# Patient Record
Sex: MALE | Race: WHITE | NOT HISPANIC OR LATINO | Employment: FULL TIME | URBAN - METROPOLITAN AREA
[De-identification: names, ages, dates, MRNs, and addresses within clinical notes are randomized per-mention and may not be internally consistent; named-entity substitution may affect disease eponyms.]

---

## 2019-07-27 ENCOUNTER — OFFICE VISIT (OUTPATIENT)
Dept: URGENT CARE | Facility: PHYSICIAN GROUP | Age: 29
End: 2019-07-27
Payer: COMMERCIAL

## 2019-07-27 VITALS
HEART RATE: 76 BPM | OXYGEN SATURATION: 97 % | RESPIRATION RATE: 16 BRPM | TEMPERATURE: 98.6 F | DIASTOLIC BLOOD PRESSURE: 82 MMHG | WEIGHT: 144 LBS | SYSTOLIC BLOOD PRESSURE: 128 MMHG

## 2019-07-27 DIAGNOSIS — R09.81 SINUS CONGESTION: ICD-10-CM

## 2019-07-27 DIAGNOSIS — J34.89 SINUS PRESSURE: ICD-10-CM

## 2019-07-27 PROCEDURE — 99203 OFFICE O/P NEW LOW 30 MIN: CPT | Performed by: FAMILY MEDICINE

## 2019-07-27 RX ORDER — AMOXICILLIN AND CLAVULANATE POTASSIUM 875; 125 MG/1; MG/1
1 TABLET, FILM COATED ORAL 2 TIMES DAILY
Qty: 14 TAB | Refills: 0 | Status: SHIPPED | OUTPATIENT
Start: 2019-07-27 | End: 2019-08-03

## 2019-07-27 NOTE — PROGRESS NOTES
Subjective:      Baron Smith is a 29 y.o. male who presents with Sinus Problem (possible sinus infection moved into chest,congestion,cough, x 5-6 days)            This is a new problem.  29-year-old otherwise healthy presenting with 6-day history of cough and sinus congestion, sinus pressure.  No history of sinus surgery or frequent sinus infection reported.  He denies any fever chills, sore throat or earache at the present time.        Review of Systems   All other systems reviewed and are negative.         Objective:     /82 (BP Location: Right arm, Patient Position: Sitting, BP Cuff Size: Adult)   Pulse 76   Temp 37 °C (98.6 °F) (Temporal)   Resp 16   Wt 65.3 kg (144 lb)   SpO2 97%      Physical Exam   Constitutional: He is oriented to person, place, and time. He appears well-developed and well-nourished.  Non-toxic appearance. No distress.   HENT:   Head: Normocephalic and atraumatic.   Right Ear: Tympanic membrane, external ear and ear canal normal.   Left Ear: Tympanic membrane, external ear and ear canal normal.   Nose: Mucosal edema present. Right sinus exhibits no maxillary sinus tenderness and no frontal sinus tenderness. Left sinus exhibits no maxillary sinus tenderness and no frontal sinus tenderness.   Mouth/Throat: Uvula is midline and oropharynx is clear and moist. No oral lesions. No trismus in the jaw. No uvula swelling. No oropharyngeal exudate, posterior oropharyngeal edema, posterior oropharyngeal erythema or tonsillar abscesses. No tonsillar exudate.   Eyes: Conjunctivae are normal. No scleral icterus.   Neck: Neck supple.   Cardiovascular: Normal rate and regular rhythm.  Exam reveals no gallop and no friction rub.    No murmur heard.  Pulmonary/Chest: Effort normal. No stridor. No respiratory distress. He has no wheezes. He has no rales.   Lymphadenopathy:     He has no cervical adenopathy.   Neurological: He is alert and oriented to person, place, and time.   Skin: Skin is  warm. No rash noted. He is not diaphoretic. No erythema. No pallor.   Psychiatric: He has a normal mood and affect.               Assessment/Plan:     1. Sinus congestion  - amoxicillin-clavulanate (AUGMENTIN) 875-125 MG Tab; Take 1 Tab by mouth 2 times a day for 7 days.  Dispense: 14 Tab; Refill: 0    2. Sinus pressure  - amoxicillin-clavulanate (AUGMENTIN) 875-125 MG Tab; Take 1 Tab by mouth 2 times a day for 7 days.  Dispense: 14 Tab; Refill: 0      Viral versus bacterial.  Discussed watchful waiting for now, prescription for Augmentin given if he is not better in the next few days.  Continue nasal saline irrigation over-the-counter education as needed for supportive care  Warning signs reviewed

## 2019-07-28 ENCOUNTER — TELEPHONE (OUTPATIENT)
Dept: URGENT CARE | Facility: PHYSICIAN GROUP | Age: 29
End: 2019-07-28

## 2019-07-28 DIAGNOSIS — J32.9 SINUSITIS, UNSPECIFIED CHRONICITY, UNSPECIFIED LOCATION: ICD-10-CM

## 2019-07-28 NOTE — TELEPHONE ENCOUNTER
Pt requesting to have RX transferred due to insurance coverage.  Please redirect to [facility] in [place] [address]

## 2019-07-29 RX ORDER — AMOXICILLIN AND CLAVULANATE POTASSIUM 875; 125 MG/1; MG/1
1 TABLET, FILM COATED ORAL 2 TIMES DAILY
Qty: 14 TAB | Refills: 0 | Status: SHIPPED | OUTPATIENT
Start: 2019-07-29 | End: 2019-08-05

## 2019-11-13 ENCOUNTER — OFFICE VISIT (OUTPATIENT)
Dept: URGENT CARE | Facility: CLINIC | Age: 29
End: 2019-11-13
Payer: COMMERCIAL

## 2019-11-13 VITALS
OXYGEN SATURATION: 97 % | TEMPERATURE: 97 F | WEIGHT: 146.6 LBS | HEART RATE: 78 BPM | BODY MASS INDEX: 23.01 KG/M2 | HEIGHT: 67 IN | RESPIRATION RATE: 16 BRPM | SYSTOLIC BLOOD PRESSURE: 120 MMHG | DIASTOLIC BLOOD PRESSURE: 80 MMHG

## 2019-11-13 DIAGNOSIS — J01.00 ACUTE MAXILLARY SINUSITIS, RECURRENCE NOT SPECIFIED: ICD-10-CM

## 2019-11-13 PROCEDURE — 99214 OFFICE O/P EST MOD 30 MIN: CPT | Performed by: PHYSICIAN ASSISTANT

## 2019-11-13 RX ORDER — AMOXICILLIN AND CLAVULANATE POTASSIUM 875; 125 MG/1; MG/1
1 TABLET, FILM COATED ORAL 2 TIMES DAILY
Qty: 14 TAB | Refills: 0 | Status: SHIPPED | OUTPATIENT
Start: 2019-11-13 | End: 2019-11-20

## 2019-11-13 ASSESSMENT — ENCOUNTER SYMPTOMS
CHILLS: 0
NAUSEA: 0
PALPITATIONS: 0
COUGH: 1
VOMITING: 0
MYALGIAS: 0
FATIGUE: 1
NECK PAIN: 0
SHORTNESS OF BREATH: 0
SPUTUM PRODUCTION: 1
SINUS PAIN: 1
SENSORY CHANGE: 0
ANOREXIA: 0
SORE THROAT: 0
FOCAL WEAKNESS: 0
TINGLING: 0
SWOLLEN GLANDS: 0
WHEEZING: 0
HEADACHES: 1
FEVER: 0
ABDOMINAL PAIN: 0

## 2019-11-13 NOTE — PROGRESS NOTES
"Subjective:      Baron Smith is a 29 y.o. male who presents with Sinus Pain (x4days, sinus pain, sinus congestion, ears congested, sore throat, dizziness)            Sinus Pain   This is a new problem. The current episode started in the past 7 days. The problem occurs constantly. The problem has been gradually worsening. Associated symptoms include congestion, coughing (productive with green mucous ), fatigue and headaches. Pertinent negatives include no abdominal pain, anorexia, chest pain, chills, fever, myalgias, nausea, neck pain, rash, sore throat, swollen glands or vomiting. The symptoms are aggravated by bending. Treatments tried: theraflu  The treatment provided mild relief.       No past medical history on file.    No past surgical history on file.    No family history on file.    Allergies   Allergen Reactions   • Shellfish Allergy        Medications, Allergies, and current problem list reviewed today in Epic    Review of Systems   Constitutional: Positive for fatigue and malaise/fatigue. Negative for chills and fever.   HENT: Positive for congestion, ear pain (left ear fullness) and sinus pain. Negative for ear discharge and sore throat.    Respiratory: Positive for cough (productive with green mucous ) and sputum production. Negative for shortness of breath and wheezing.    Cardiovascular: Negative for chest pain, palpitations and leg swelling.   Gastrointestinal: Negative for abdominal pain, anorexia, nausea and vomiting.   Musculoskeletal: Negative for myalgias and neck pain.   Skin: Negative for rash.   Neurological: Positive for headaches. Negative for tingling, sensory change and focal weakness.     All other systems reviewed and are negative.        Objective:     /80 (BP Location: Left arm, Patient Position: Sitting, BP Cuff Size: Adult)   Pulse 78   Temp 36.1 °C (97 °F) (Temporal)   Resp 16   Ht 1.702 m (5' 7\")   Wt 66.5 kg (146 lb 9.6 oz)   SpO2 97%   BMI 22.96 kg/m²  "     Physical Exam  Constitutional:       General: He is not in acute distress.     Appearance: He is not ill-appearing or diaphoretic.   HENT:      Head: Normocephalic and atraumatic.      Right Ear: Tympanic membrane, ear canal and external ear normal.      Left Ear: Ear canal and external ear normal. A middle ear effusion is present.      Nose: Mucosal edema and rhinorrhea present.      Right Sinus: Maxillary sinus tenderness present.      Left Sinus: Maxillary sinus tenderness present.      Mouth/Throat:      Mouth: Mucous membranes are moist.      Pharynx: Oropharynx is clear. Posterior oropharyngeal erythema present.   Neck:      Musculoskeletal: Normal range of motion.   Cardiovascular:      Rate and Rhythm: Normal rate and regular rhythm.      Heart sounds: No murmur. No friction rub. No gallop.    Pulmonary:      Effort: Pulmonary effort is normal. No respiratory distress.      Breath sounds: Normal breath sounds. No wheezing, rhonchi or rales.   Musculoskeletal: Normal range of motion.   Lymphadenopathy:      Cervical: No cervical adenopathy.   Skin:     General: Skin is warm and dry.      Findings: No rash.   Neurological:      General: No focal deficit present.      Mental Status: He is alert and oriented to person, place, and time.   Psychiatric:         Mood and Affect: Mood normal.         Behavior: Behavior normal.         Thought Content: Thought content normal.         Judgment: Judgment normal.                 Assessment/Plan:     1. Acute maxillary sinusitis, recurrence not specified  amoxicillin-clavulanate (AUGMENTIN) 875-125 MG Tab         Current Outpatient Medications:   •  amoxicillin-clavulanate (AUGMENTIN) 875-125 MG Tab, Take 1 Tab by mouth 2 times a day for 7 days., Disp: 14 Tab, Rfl: 0    Push fluids. Viral etiology discussed.  Patient was given a contingent antibiotic prescription to fill and use as directed if symptoms progressed as discussed and agreed upon.    Encouraged Flonase and  saline rinses.     Differential diagnoses, Supportive care, and indications for immediate follow-up discussed with patient.   Instructed to return to clinic or nearest emergency department for any change in condition, further concerns, or worsening of symptoms.    The patient demonstrated a good understanding and agreed with the treatment plan.    Tameka Barron P.A.-C.

## 2019-12-16 ENCOUNTER — OFFICE VISIT (OUTPATIENT)
Dept: URGENT CARE | Facility: CLINIC | Age: 29
End: 2019-12-16
Payer: COMMERCIAL

## 2019-12-16 VITALS
HEART RATE: 85 BPM | RESPIRATION RATE: 16 BRPM | DIASTOLIC BLOOD PRESSURE: 82 MMHG | TEMPERATURE: 99.1 F | WEIGHT: 146 LBS | BODY MASS INDEX: 22.91 KG/M2 | OXYGEN SATURATION: 96 % | SYSTOLIC BLOOD PRESSURE: 110 MMHG | HEIGHT: 67 IN

## 2019-12-16 DIAGNOSIS — R68.89 FLU-LIKE SYMPTOMS: ICD-10-CM

## 2019-12-16 DIAGNOSIS — K12.0 CANKER SORES ORAL: ICD-10-CM

## 2019-12-16 LAB
INT CON NEG: NEGATIVE
INT CON POS: POSITIVE
S PYO AG THROAT QL: NEGATIVE

## 2019-12-16 PROCEDURE — 87880 STREP A ASSAY W/OPTIC: CPT | Performed by: PHYSICIAN ASSISTANT

## 2019-12-16 PROCEDURE — 99214 OFFICE O/P EST MOD 30 MIN: CPT | Performed by: PHYSICIAN ASSISTANT

## 2019-12-16 RX ORDER — ACETAMINOPHEN 500 MG
500-1000 TABLET ORAL EVERY 6 HOURS PRN
COMMUNITY
End: 2021-10-14

## 2019-12-16 RX ORDER — DEXAMETHASONE 0.5 MG/5ML
0.5 ELIXIR ORAL 3 TIMES DAILY
Qty: 75 ML | Refills: 0 | Status: SHIPPED | OUTPATIENT
Start: 2019-12-16 | End: 2019-12-21

## 2019-12-16 ASSESSMENT — ENCOUNTER SYMPTOMS
HEADACHES: 1
COUGH: 1
SORE THROAT: 1
NAUSEA: 0
VOMITING: 0
FEVER: 1

## 2019-12-16 NOTE — PROGRESS NOTES
Subjective:   Baron Smith is a 29 y.o. male who presents for Fever (x4days, congestion, this morning sorethroat)        Patient complains of sore throat, congestion, fever x4 days.  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    Fever    This is a new problem. The current episode started in the past 7 days. The problem occurs constantly. The problem has been gradually improving. The maximum temperature noted was 100 to 100.9 F. The temperature was taken using an oral thermometer. Associated symptoms include congestion (mild), coughing (mild dry), headaches, muscle aches and a sore throat. Pertinent negatives include no ear pain, nausea or vomiting. He has tried acetaminophen and NSAIDs for the symptoms. The treatment provided moderate relief.   Risk factors: sick contacts      Review of Systems   Constitutional: Positive for fever.   HENT: Positive for congestion (mild) and sore throat. Negative for ear pain.    Respiratory: Positive for cough (mild dry).    Gastrointestinal: Negative for nausea and vomiting.   Neurological: Positive for headaches.       PMH:  has no past medical history on file.  MEDS:   Current Outpatient Medications:   •  acetaminophen (TYLENOL) 500 MG Tab, Take 500-1,000 mg by mouth every 6 hours as needed., Disp: , Rfl:   •  dexamethasone (DECADRON) 0.5 MG/5ML Elixir, Take 5 mL by mouth 3 times a day for 5 days. Swish/ gurgle and spit. Do not rinse, eat or drink for 30 min following treatment., Disp: 75 mL, Rfl: 0  ALLERGIES:   Allergies   Allergen Reactions   • Shellfish Allergy      SURGHX: History reviewed. No pertinent surgical history.  SOCHX:  reports that he has never smoked. He has never used smokeless tobacco. He reports current alcohol use. He reports current drug use. Drug: Marijuana.  FH: Family history was reviewed, no pertinent findings to report   Objective:   /82 (BP Location: Left arm, Patient Position: Sitting, BP Cuff Size: Adult)   Pulse 85   Temp 37.3 °C (99.1 °F)  "(Temporal)   Resp 16   Ht 1.702 m (5' 7\")   Wt 66.2 kg (146 lb)   SpO2 96%   BMI 22.87 kg/m²   Physical Exam  Vitals signs reviewed.   Constitutional:       General: He is not in acute distress.     Appearance: Normal appearance. He is well-developed. He is not toxic-appearing.   HENT:      Head: Normocephalic and atraumatic.      Right Ear: Tympanic membrane, ear canal and external ear normal.      Left Ear: Tympanic membrane, ear canal and external ear normal.      Nose: Mucosal edema present. No congestion or rhinorrhea.      Mouth/Throat:      Lips: Pink.      Mouth: Mucous membranes are moist. Oral lesions (Several aphthous ulcers on anterior peritonsillar pillars and one on right bugle mucosa.  No discharge.  No vesicles.) present.      Pharynx: Oropharynx is clear. Uvula midline.      Tonsils: No tonsillar exudate. Swellin+ on the right. 1+ on the left.   Eyes:      General: Lids are normal.      Conjunctiva/sclera: Conjunctivae normal.   Neck:      Musculoskeletal: Neck supple.   Cardiovascular:      Rate and Rhythm: Normal rate and regular rhythm.      Heart sounds: Normal heart sounds, S1 normal and S2 normal. No murmur. No friction rub. No gallop.    Pulmonary:      Effort: Pulmonary effort is normal. No respiratory distress.      Breath sounds: Normal breath sounds. No decreased breath sounds, wheezing, rhonchi or rales.   Musculoskeletal:      Comments: Normal range of motion. Exhibits no edema and no tenderness.    Lymphadenopathy:      Cervical:      Right cervical: No superficial or posterior cervical adenopathy.     Left cervical: No superficial or posterior cervical adenopathy.   Skin:     General: Skin is warm and dry.      Capillary Refill: Capillary refill takes less than 2 seconds.   Neurological:      Mental Status: He is alert and oriented to person, place, and time.      Cranial Nerves: No cranial nerve deficit.      Sensory: No sensory deficit.   Psychiatric:         Speech: Speech " normal.         Behavior: Behavior normal.         Thought Content: Thought content normal.         Judgment: Judgment normal.           Assessment/Plan:   1. Flu-like symptoms    2. Canker sores oral  - POCT Rapid Strep A  - dexamethasone (DECADRON) 0.5 MG/5ML Elixir; Take 5 mL by mouth 3 times a day for 5 days. Swish/ gurgle and spit. Do not rinse, eat or drink for 30 min following treatment.  Dispense: 75 mL; Refill: 0    Other orders  - acetaminophen (TYLENOL) 500 MG Tab; Take 500-1,000 mg by mouth every 6 hours as needed.    I suspect symptoms may be secondary to flu.  Rapid strep negative.  Patient's worst symptom is his oral pain that appears to be secondary to aphthous ulcers.  I will treat with topical steroids.  Patient states that he gets these frequently.  I would like him to follow-up with his PCP to have this further evaluated.    VSS, no dyspnea, no SOB, and lungs CTA on PE.  Goals of care include symptomatic control and prevention of lower respiratory spread. Signs of lower respiratory involvement discussed with pt.  Pt instructed to RTC if any of these are observed.     Drink plenty of fluids and rest.  Use nasal saline TID to promote drainage.   Salt water gurgles to soothe sore throat.  Start OTC expectorant.  APAP for fever control, and NSAIDs for throat pain/headache relief prn.    If you fail to improve in 3-5 days or symptoms worsen/new symptoms develop, RTC for reevaluation.    If symptoms worsen or new symptoms develop to include uncontrolled fevers, dyspnea, SOB, or vomiting- pt was instructed to go to the ED for evaluation.    Differential diagnosis, natural history, supportive care, and indications for immediate follow-up discussed.

## 2019-12-19 ENCOUNTER — HOSPITAL ENCOUNTER (EMERGENCY)
Dept: HOSPITAL 8 - ED | Age: 29
Discharge: HOME | End: 2019-12-19
Payer: COMMERCIAL

## 2019-12-19 VITALS — HEIGHT: 67 IN | WEIGHT: 141.32 LBS | BODY MASS INDEX: 22.18 KG/M2

## 2019-12-19 VITALS — SYSTOLIC BLOOD PRESSURE: 144 MMHG | DIASTOLIC BLOOD PRESSURE: 86 MMHG

## 2019-12-19 DIAGNOSIS — F17.200: ICD-10-CM

## 2019-12-19 DIAGNOSIS — B34.9: Primary | ICD-10-CM

## 2019-12-19 PROCEDURE — 94640 AIRWAY INHALATION TREATMENT: CPT

## 2019-12-19 PROCEDURE — 93005 ELECTROCARDIOGRAM TRACING: CPT

## 2019-12-19 PROCEDURE — 99283 EMERGENCY DEPT VISIT LOW MDM: CPT

## 2019-12-19 PROCEDURE — 71046 X-RAY EXAM CHEST 2 VIEWS: CPT

## 2019-12-19 NOTE — NUR
PT MED AS NOTED.  LEFT THIGH BURN SILVADENE CREAM APPLIED, ADAPTIC DRESSING, 
KERLEX AND THEN COBAN WRAP.  PT TOLLERATED WELL. [facility] MD AT BEDSIDE, POC 
DISCUSSED AND QUESTIONS ANSWERED.

## 2019-12-19 NOTE — NUR
DR WALLIS AT BEDSIDE. PT VSS, LUNG CLEAR.  POC DISCUSSED AND ORDERS REC'D.  
CALL LIGHT W/I REACH, WARM BLANKET AND PILLOW PROVIDED

## 2020-01-25 ENCOUNTER — TELEPHONE (OUTPATIENT)
Dept: SCHEDULING | Facility: IMAGING CENTER | Age: 30
End: 2020-01-25

## 2020-01-31 ENCOUNTER — OFFICE VISIT (OUTPATIENT)
Dept: MEDICAL GROUP | Facility: MEDICAL CENTER | Age: 30
End: 2020-01-31
Payer: COMMERCIAL

## 2020-01-31 VITALS
DIASTOLIC BLOOD PRESSURE: 82 MMHG | TEMPERATURE: 98.9 F | HEIGHT: 67 IN | HEART RATE: 71 BPM | OXYGEN SATURATION: 97 % | SYSTOLIC BLOOD PRESSURE: 126 MMHG | WEIGHT: 144.18 LBS | BODY MASS INDEX: 22.63 KG/M2

## 2020-01-31 DIAGNOSIS — Z91.013 ALLERGY TO SHELLFISH: ICD-10-CM

## 2020-01-31 DIAGNOSIS — Z00.00 WELL ADULT EXAM: ICD-10-CM

## 2020-01-31 DIAGNOSIS — Z23 NEED FOR VACCINATION: ICD-10-CM

## 2020-01-31 DIAGNOSIS — F41.9 ANXIETY: ICD-10-CM

## 2020-01-31 PROCEDURE — 99214 OFFICE O/P EST MOD 30 MIN: CPT | Mod: 25 | Performed by: FAMILY MEDICINE

## 2020-01-31 PROCEDURE — 90471 IMMUNIZATION ADMIN: CPT | Performed by: FAMILY MEDICINE

## 2020-01-31 PROCEDURE — 90715 TDAP VACCINE 7 YRS/> IM: CPT | Performed by: FAMILY MEDICINE

## 2020-01-31 RX ORDER — EPINEPHRINE 0.3 MG/.3ML
0.3 INJECTION SUBCUTANEOUS ONCE
Qty: 0.3 ML | Refills: 0 | Status: SHIPPED | OUTPATIENT
Start: 2020-01-31 | End: 2020-01-31

## 2020-01-31 RX ORDER — SERTRALINE HYDROCHLORIDE 25 MG/1
25 TABLET, FILM COATED ORAL DAILY
Qty: 30 TAB | Refills: 11 | Status: SHIPPED | OUTPATIENT
Start: 2020-01-31 | End: 2021-03-08

## 2020-01-31 RX ORDER — EPINEPHRINE 0.3 MG/.3ML
0.3 INJECTION SUBCUTANEOUS ONCE
COMMUNITY
End: 2020-01-31 | Stop reason: SDUPTHER

## 2020-01-31 ASSESSMENT — PATIENT HEALTH QUESTIONNAIRE - PHQ9
CLINICAL INTERPRETATION OF PHQ2 SCORE: 3
5. POOR APPETITE OR OVEREATING: 3 - NEARLY EVERY DAY
SUM OF ALL RESPONSES TO PHQ QUESTIONS 1-9: 13

## 2020-01-31 NOTE — PROGRESS NOTES
Subjective:     CC: Diagnoses of Need for vaccination, Anxiety, Allergy to shellfish, and Well adult exam were pertinent to this visit.    HPI: Patient is a 29 y.o. male new patient who presents today to [facility] care.  He is generally quite healthy.  His only concern today is recent anxiety.      Anxiety  New problem.  Had a severe panic attack around [place] time which she went to the ER at [place].  He had a chest x-ray that was clear as well as breathing test which were normal. Has had significant anxiety daily since his panic attack.  He has had small bouts of anxiety in the past but never this severe.   He just started therapy 2weeks ago.   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   Working out now with no improvement.   Improved eating habits with no improvement.   Patient does have a history of depression.  He was treated with an SSRI for 1 year back in high school.    Allergy to shellfish  Chronic problem.  The patient requests a refill on his EpiPen.  He is allergic to shellfish.    Past Medical History:   Diagnosis Date   • Allergy    • Anxiety        Social History     Tobacco Use   • Smoking status: Never Smoker   • Smokeless tobacco: Never Used   Substance Use Topics   • Alcohol use: Yes     Comment: social   • Drug use: Yes     Types: Marijuana       Current Outpatient Medications Ordered in Epic   Medication Sig Dispense Refill   • EPINEPHrine (EPIPEN 2-GUMARO) 0.3 MG/0.3ML Solution Auto-injector solution for injection 0.3 mL by Intramuscular route Once for 1 dose. 0.3 mL 0   • sertraline (ZOLOFT) 25 MG tablet Take 1 Tab by mouth every day. 30 Tab 11   • acetaminophen (TYLENOL) 500 MG Tab Take 500-1,000 mg by mouth every 6 hours as needed.       No current Epic-ordered facility-administered medications on file.        Allergies:  Shellfish allergy    Health Maintenance: Completed    ROS:  Gen: no fevers/chill, no changes in weight  Eyes: no  "changes in vision  ENT: no sore throat, no hearing loss, no bloody nose  Pulm: no sob, no cough  CV: no chest pain, no palpitations  GI: no nausea/vomiting, no diarrhea  : no dysuria  MSk: no myalgias  Skin: no rash  Neuro: no headaches, no numbness/tingling  Heme/Lymph: no easy bruising      Objective:       Exam:  /82 (BP Location: Left arm, Patient Position: Sitting, BP Cuff Size: Adult)   Pulse 71   Temp 37.2 °C (98.9 °F) (Temporal)   Ht 1.702 m (5' 7\")   Wt 65.4 kg (144 lb 2.9 oz)   SpO2 97%   BMI 22.58 kg/m²  Body mass index is 22.58 kg/m².      General: Normal appearing. No distress.  HEAD: NCAT  EYES: conjunctiva clear, lids without ptosis, pupils equal  and reactive to light  EARS: ears normal shape and contour, canals are clear bilaterally, TMs clear  MOUTH: oropharynx is without erythema, edema or exudates.   Neck: Supple without masses. Thyroid is not enlarged. Normal ROM  Pulmonary: Clear to ausculation.  Normal effort. No rales, ronchi, or wheezing.  Cardiovascular: Regular rate and rhythm, no murmur. No LE edema  Neurologic: Grossly normal, no focal deficits  Lymph: No cervical or supraclavicular lymph nodes are palpable  Skin: Warm and dry.  No obvious lesions.  Musculoskeletal: Normal gait and station.   Psych: Normal mood and affect. Alert and oriented x3. Judgment and insight is normal.      Assessment & Plan:     29 y.o. male with the following -     1. Anxiety  New problem.  The patient reports severe generalized anxiety daily for the past 6 weeks.  The patient is interested in possibly starting a daily medication.  Prescription given for sertraline 25 mg.  He is already working with a therapist.  The patient is a therapist himself as well.  Plan to follow-up in 4 weeks.  - sertraline (ZOLOFT) 25 MG tablet; Take 1 Tab by mouth every day.  Dispense: 30 Tab; Refill: 11    2. Allergy to shellfish  Chronic problem.  The patient requests a refill on his EpiPen.  He is allergic to " shellfish.  - EPINEPHrine (EPIPEN 2-GUMARO) 0.3 MG/0.3ML Solution Auto-injector solution for injection; 0.3 mL by Intramuscular route Once for 1 dose.  Dispense: 0.3 mL; Refill: 0    3. Well adult exam  - TSH WITH REFLEX TO FT4; Future  - CBC WITH DIFFERENTIAL; Future  - Comp Metabolic Panel; Future    4. Need for vaccination  - Tdap Vaccine =>8YO IM      Return in about 4 weeks (around 2/28/2020).    Please note that this dictation was created using voice recognition software. I have made every reasonable attempt to correct obvious errors, but I expect that there are errors of grammar and possibly content that I did not discover before finalizing the note.

## 2020-01-31 NOTE — ASSESSMENT & PLAN NOTE
Had a panic attack around [place] time. Has had significant anxiety daily since his panic attack. He did go the ER at [place] [facility] for this panic attack. Had CXR which was clear and a breathing test which was normal.   He has had small bouts of anxiety in the past but never this severe.   He just started therapy 2-3 weeks.   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   Working out now.   Improved eating.

## 2020-02-03 ENCOUNTER — HOSPITAL ENCOUNTER (OUTPATIENT)
Dept: LAB | Facility: MEDICAL CENTER | Age: 30
End: 2020-02-03
Attending: FAMILY MEDICINE
Payer: COMMERCIAL

## 2020-02-03 DIAGNOSIS — Z00.00 WELL ADULT EXAM: ICD-10-CM

## 2020-02-03 LAB
ALBUMIN SERPL BCP-MCNC: 5.1 G/DL (ref 3.2–4.9)
ALBUMIN/GLOB SERPL: 2 G/DL
ALP SERPL-CCNC: 50 U/L (ref 30–99)
ALT SERPL-CCNC: 10 U/L (ref 2–50)
ANION GAP SERPL CALC-SCNC: 8 MMOL/L (ref 0–11.9)
AST SERPL-CCNC: 15 U/L (ref 12–45)
BASOPHILS # BLD AUTO: 1 % (ref 0–1.8)
BASOPHILS # BLD: 0.08 K/UL (ref 0–0.12)
BILIRUB SERPL-MCNC: 1.1 MG/DL (ref 0.1–1.5)
BUN SERPL-MCNC: 13 MG/DL (ref 8–22)
CALCIUM SERPL-MCNC: 10.4 MG/DL (ref 8.5–10.5)
CHLORIDE SERPL-SCNC: 103 MMOL/L (ref 96–112)
CO2 SERPL-SCNC: 26 MMOL/L (ref 20–33)
CREAT SERPL-MCNC: 1.01 MG/DL (ref 0.5–1.4)
EOSINOPHIL # BLD AUTO: 0.17 K/UL (ref 0–0.51)
EOSINOPHIL NFR BLD: 2.2 % (ref 0–6.9)
ERYTHROCYTE [DISTWIDTH] IN BLOOD BY AUTOMATED COUNT: 42.5 FL (ref 35.9–50)
GLOBULIN SER CALC-MCNC: 2.6 G/DL (ref 1.9–3.5)
GLUCOSE SERPL-MCNC: 86 MG/DL (ref 65–99)
HCT VFR BLD AUTO: 45.3 % (ref 42–52)
HGB BLD-MCNC: 15.6 G/DL (ref 14–18)
IMM GRANULOCYTES # BLD AUTO: 0.02 K/UL (ref 0–0.11)
IMM GRANULOCYTES NFR BLD AUTO: 0.3 % (ref 0–0.9)
LYMPHOCYTES # BLD AUTO: 2.36 K/UL (ref 1–4.8)
LYMPHOCYTES NFR BLD: 30 % (ref 22–41)
MCH RBC QN AUTO: 31.8 PG (ref 27–33)
MCHC RBC AUTO-ENTMCNC: 34.4 G/DL (ref 33.7–35.3)
MCV RBC AUTO: 92.4 FL (ref 81.4–97.8)
MONOCYTES # BLD AUTO: 0.56 K/UL (ref 0–0.85)
MONOCYTES NFR BLD AUTO: 7.1 % (ref 0–13.4)
NEUTROPHILS # BLD AUTO: 4.68 K/UL (ref 1.82–7.42)
NEUTROPHILS NFR BLD: 59.4 % (ref 44–72)
NRBC # BLD AUTO: 0 K/UL
NRBC BLD-RTO: 0 /100 WBC
PLATELET # BLD AUTO: 320 K/UL (ref 164–446)
PMV BLD AUTO: 9.6 FL (ref 9–12.9)
POTASSIUM SERPL-SCNC: 4.1 MMOL/L (ref 3.6–5.5)
PROT SERPL-MCNC: 7.7 G/DL (ref 6–8.2)
RBC # BLD AUTO: 4.9 M/UL (ref 4.7–6.1)
SODIUM SERPL-SCNC: 137 MMOL/L (ref 135–145)
TSH SERPL DL<=0.005 MIU/L-ACNC: 0.93 UIU/ML (ref 0.38–5.33)
WBC # BLD AUTO: 7.9 K/UL (ref 4.8–10.8)

## 2020-02-03 PROCEDURE — 85025 COMPLETE CBC W/AUTO DIFF WBC: CPT

## 2020-02-03 PROCEDURE — 80053 COMPREHEN METABOLIC PANEL: CPT

## 2020-02-03 PROCEDURE — 36415 COLL VENOUS BLD VENIPUNCTURE: CPT

## 2020-02-03 PROCEDURE — 84443 ASSAY THYROID STIM HORMONE: CPT

## 2020-03-02 ENCOUNTER — OFFICE VISIT (OUTPATIENT)
Dept: MEDICAL GROUP | Facility: MEDICAL CENTER | Age: 30
End: 2020-03-02
Payer: COMMERCIAL

## 2020-03-02 VITALS
HEIGHT: 67 IN | HEART RATE: 71 BPM | BODY MASS INDEX: 22.35 KG/M2 | OXYGEN SATURATION: 94 % | TEMPERATURE: 97.9 F | SYSTOLIC BLOOD PRESSURE: 100 MMHG | WEIGHT: 142.4 LBS | DIASTOLIC BLOOD PRESSURE: 68 MMHG

## 2020-03-02 DIAGNOSIS — F41.9 ANXIETY: ICD-10-CM

## 2020-03-02 PROCEDURE — 99213 OFFICE O/P EST LOW 20 MIN: CPT | Performed by: FAMILY MEDICINE

## 2020-03-02 ASSESSMENT — FIBROSIS 4 INDEX: FIB4 SCORE: 0.42

## 2020-03-02 NOTE — PROGRESS NOTES
"Subjective:     CC: The encounter diagnosis was Anxiety.    HPI: Patient is a 30 y.o. male established patient who presents today to follow-up on anxiety and discuss labs.      Anxiety  Chronic problem.  The patient note significant improvement with sertraline.  We started on 12.5 mg daily, he is now on 25 mg for the past 10 days.  He did have significant nausea, that has resolved after the first 5 days.  He has found a therapist, started therapy.   No panic attacks since starting the medication.  Has been exercising, training for 1/2 marathon.       Past Medical History:   Diagnosis Date   • Allergy    • Anxiety        Social History     Tobacco Use   • Smoking status: Never Smoker   • Smokeless tobacco: Never Used   Substance Use Topics   • Alcohol use: Yes     Comment: social   • Drug use: Yes     Types: Marijuana       Current Outpatient Medications Ordered in Epic   Medication Sig Dispense Refill   • sertraline (ZOLOFT) 25 MG tablet Take 1 Tab by mouth every day. 30 Tab 11   • acetaminophen (TYLENOL) 500 MG Tab Take 500-1,000 mg by mouth every 6 hours as needed.       No current Epic-ordered facility-administered medications on file.        Allergies:  Shellfish allergy    Health Maintenance: Completed    ROS:  Pulm: no sob, no cough  CV: no chest pain, no palpitations        Objective:       Exam:  /68 (BP Location: Left arm, Patient Position: Sitting, BP Cuff Size: Adult long)   Pulse 71   Temp 36.6 °C (97.9 °F) (Temporal)   Ht 1.702 m (5' 7\")   Wt 64.6 kg (142 lb 6.4 oz)   SpO2 94%   BMI 22.30 kg/m²  Body mass index is 22.3 kg/m².    General: Normal appearing. No distress.  HEAD: NCAT  EYES: conjunctiva clear, lids without ptosis, pupils equal and reactive to light  EARS: ears normal shape and contour.  MOUTH: normal dentition   Neck:  Normal ROM  Pulmonary: Normal effort. Normal respiratory rate.  Cardiovascular: Well perfused. No LE edema  Neurologic: Grossly normal, no focal deficits  Skin: " Warm and dry.  No obvious lesions.  Musculoskeletal: Normal gait and station.   Psych: Normal mood and affect. Alert and oriented x3. Judgment and insight is normal.    Labs: 2/3/2020 results reviewed and discussed with the patient, questions answered.    Assessment & Plan:     30 y.o. male with the following -     1. Anxiety  Chronic problem, improving with sertraline 25 mg.  The patient is currently in therapy.  No panic attacks since last visit.  Plan to follow-up in 3 months.  May consider weaning back off at that point.      Return in about 3 months (around 6/2/2020).    Please note that this dictation was created using voice recognition software. I have made every reasonable attempt to correct obvious errors, but I expect that there are errors of grammar and possibly content that I did not discover before finalizing the note.

## 2020-03-02 NOTE — ASSESSMENT & PLAN NOTE
Chronic problem. Improving. Side effects are improving.   Found a therapist, started therapy.   No panic attacks  Has been exercising, training for 1/2 marathon.

## 2020-03-17 ENCOUNTER — OFFICE VISIT (OUTPATIENT)
Dept: MEDICAL GROUP | Facility: MEDICAL CENTER | Age: 30
End: 2020-03-17
Payer: COMMERCIAL

## 2020-03-17 VITALS
SYSTOLIC BLOOD PRESSURE: 130 MMHG | WEIGHT: 140.8 LBS | DIASTOLIC BLOOD PRESSURE: 88 MMHG | HEIGHT: 67 IN | HEART RATE: 68 BPM | OXYGEN SATURATION: 96 % | BODY MASS INDEX: 22.1 KG/M2 | TEMPERATURE: 97.9 F

## 2020-03-17 DIAGNOSIS — R07.89 CHEST TIGHTNESS: ICD-10-CM

## 2020-03-17 PROCEDURE — 99213 OFFICE O/P EST LOW 20 MIN: CPT | Performed by: FAMILY MEDICINE

## 2020-03-17 ASSESSMENT — FIBROSIS 4 INDEX: FIB4 SCORE: 0.42

## 2020-03-17 NOTE — PROGRESS NOTES
"Subjective:     CC: The encounter diagnosis was Chest tightness.    HPI: Patient is a 30 y.o. male established patient who presents today with concern regarding chest tightness.      Chest tightness  New problem.  Constant  Feels sharp and tight x 5 days in the anterior chest.  Exercises regularly, not issues with exercise.   Has not exercised since this started.   No cough  No fevers or chills  + lightheadedness with standing.  + ear ache  Mild congestion in the sinuses.  Denies any SOB.   Worse with deep breaths.  The patient does report improvement starting today.  Patient does have a history of anxiety, currently on sertraline.  States he does not feel this chest tightness/pain has to do with his anxiety.      Past Medical History:   Diagnosis Date   • Allergy    • Anxiety        Social History     Tobacco Use   • Smoking status: Never Smoker   • Smokeless tobacco: Never Used   Substance Use Topics   • Alcohol use: Yes     Comment: social   • Drug use: Yes     Types: Marijuana       Current Outpatient Medications Ordered in Epic   Medication Sig Dispense Refill   • sertraline (ZOLOFT) 25 MG tablet Take 1 Tab by mouth every day. 30 Tab 11   • acetaminophen (TYLENOL) 500 MG Tab Take 500-1,000 mg by mouth every 6 hours as needed.       No current Epic-ordered facility-administered medications on file.        Allergies:  Shellfish allergy    Health Maintenance: Completed    ROS:  Gen: no fevers/chill, no changes in weight  GI: no nausea/vomiting, no diarrhea        Objective:       Exam:  /88 (BP Location: Left arm, Patient Position: Sitting, BP Cuff Size: Adult long)   Pulse 68   Temp 36.6 °C (97.9 °F) (Temporal)   Ht 1.702 m (5' 7\")   Wt 63.9 kg (140 lb 12.8 oz)   SpO2 96%   BMI 22.05 kg/m²  Body mass index is 22.05 kg/m².      General: Normal appearing. No distress.  HEAD: NCAT  EYES: conjunctiva clear, lids without ptosis, pupils equal  and reactive to light  EARS: ears normal shape and contour, " canals are clear bilaterally, TMs clear  MOUTH: oropharynx is without erythema, edema or exudates.   Neck: Supple without masses. Thyroid is not enlarged. Normal ROM  Pulmonary: Clear to ausculation.  Normal effort. No rales, ronchi, or wheezing.  Cardiovascular: Regular rate and rhythm, no murmur. No LE edema  Neurologic: Grossly normal, no focal deficits  Lymph: No cervical or supraclavicular lymph nodes are palpable  Skin: Warm and dry.  No obvious lesions.  Musculoskeletal: Normal gait and station.   Psych: Normal mood and affect. Alert and oriented x3. Judgment and insight is normal.       Assessment & Plan:     30 y.o. male with the following -     1. Chest tightness  New problem.  The patient does have some associated sinus congestion and ear ache.  Possibly due to viral infection?  Exam was completely normal including lung exam, ear nose and throat and heart.  Given that this is starting to improve advised to continue to monitor for now.  Plan to follow-up if no improvement.      Return if symptoms worsen or fail to improve.    Please note that this dictation was created using voice recognition software. I have made every reasonable attempt to correct obvious errors, but I expect that there are errors of grammar and possibly content that I did not discover before finalizing the note.

## 2020-03-17 NOTE — ASSESSMENT & PLAN NOTE
Constant  Feels sharp and tight x 5 days  Exercises regularly, not issues with exercise.   Has not exercised since this started.   No cough  No fevers or chills  + lightheadedness with standing.  + ear ache  Mild congestion in the sinuses.  Denies any SOB.   Worse with deep breaths

## 2020-06-08 ENCOUNTER — OFFICE VISIT (OUTPATIENT)
Dept: MEDICAL GROUP | Facility: MEDICAL CENTER | Age: 30
End: 2020-06-08
Payer: COMMERCIAL

## 2020-06-08 VITALS
BODY MASS INDEX: 24.01 KG/M2 | WEIGHT: 153 LBS | HEIGHT: 67 IN | OXYGEN SATURATION: 100 % | TEMPERATURE: 98.3 F | DIASTOLIC BLOOD PRESSURE: 80 MMHG | SYSTOLIC BLOOD PRESSURE: 118 MMHG | HEART RATE: 65 BPM

## 2020-06-08 DIAGNOSIS — F41.9 ANXIETY: ICD-10-CM

## 2020-06-08 DIAGNOSIS — T78.40XA ALLERGIC STATE, INITIAL ENCOUNTER: ICD-10-CM

## 2020-06-08 DIAGNOSIS — R07.89 CHEST TIGHTNESS: ICD-10-CM

## 2020-06-08 PROCEDURE — 99214 OFFICE O/P EST MOD 30 MIN: CPT | Performed by: FAMILY MEDICINE

## 2020-06-08 RX ORDER — FLUTICASONE PROPIONATE 50 MCG
1 SPRAY, SUSPENSION (ML) NASAL 2 TIMES DAILY
COMMUNITY
End: 2021-10-14

## 2020-06-08 ASSESSMENT — FIBROSIS 4 INDEX: FIB4 SCORE: 0.42

## 2020-06-08 NOTE — PROGRESS NOTES
"Subjective:     CC: Diagnoses of Chest tightness, Anxiety, and Allergic state, initial encounter were pertinent to this visit.    HPI: Patient is a 30 y.o. male established patient who presents today to f/u on anxiety.      Chest tightness  Chronic problem. Now resolved. Assoc with some other viral symptoms.     Anxiety  Chronic problem. Mood is doing well. Currently on zoloft 25mg. Continues therapy. Would like to stay on medication for the next few months.     Allergies  New problem. Reports sinus congestion. Denies any fevers, chills, body aches. Currently using flonase BID.   The pressure started about 1.5 months ago.   Reports dry scratchy throat as well.         Past Medical History:   Diagnosis Date   • Allergy    • Anxiety        Social History     Tobacco Use   • Smoking status: Never Smoker   • Smokeless tobacco: Never Used   Substance Use Topics   • Alcohol use: Yes     Comment: social   • Drug use: Yes     Types: Marijuana       Current Outpatient Medications Ordered in Epic   Medication Sig Dispense Refill   • fluticasone (FLONASE) 50 MCG/ACT nasal spray Spray 1 Spray in nose 2 times a day.     • sertraline (ZOLOFT) 25 MG tablet Take 1 Tab by mouth every day. 30 Tab 11   • acetaminophen (TYLENOL) 500 MG Tab Take 500-1,000 mg by mouth every 6 hours as needed.       No current Epic-ordered facility-administered medications on file.        Allergies:  Shellfish allergy    Health Maintenance: Completed    ROS:  Pulm: no sob, no cough  CV: no chest pain, no palpitations      Objective:       Exam:  /80 (BP Location: Left arm, Patient Position: Sitting, BP Cuff Size: Adult long)   Pulse 65   Temp 36.8 °C (98.3 °F) (Temporal)   Ht 1.702 m (5' 7\")   Wt 69.4 kg (153 lb)   SpO2 100%   BMI 23.96 kg/m²  Body mass index is 23.96 kg/m².    General: Normal appearing. No distress.  HEAD: NCAT  EYES: conjunctiva clear, lids without ptosis, pupils equal and reactive to light  EARS: ears normal shape and " contour.  MOUTH: normal dentition   Neck:  Normal ROM  Pulmonary: Normal effort. Normal respiratory rate.  Cardiovascular: Well perfused. No LE edema  Neurologic: Grossly normal, no focal deficits  Skin: Warm and dry.  No obvious lesions.  Musculoskeletal: Normal gait and station.   Psych: Normal mood and affect. Alert and oriented x3. Judgment and insight is normal.    Labs: 2/3/20 Results reviewed and discussed with the patient, questions answered.    Assessment & Plan:     30 y.o. male with the following -     1. Chest tightness  Chronic problem, now resolved.  Possibly due to viral infection?  Etiology unclear.    2. Anxiety  Chronic problem, well-controlled on Zoloft 25 mg and participating in therapy.  Plan to follow-up in 3 months.    3. Allergic state, initial encounter  New problem.  Advised to start nasal rinses.  Also instructed him on the proper use of Flonase.  Advised to continue that twice daily.  If no improvement plan to follow-up.      Return in about 3 months (around 9/8/2020).    Please note that this dictation was created using voice recognition software. I have made every reasonable attempt to correct obvious errors, but I expect that there are errors of grammar and possibly content that I did not discover before finalizing the note.

## 2020-09-08 ENCOUNTER — OFFICE VISIT (OUTPATIENT)
Dept: MEDICAL GROUP | Facility: MEDICAL CENTER | Age: 30
End: 2020-09-08
Payer: COMMERCIAL

## 2020-09-08 VITALS
SYSTOLIC BLOOD PRESSURE: 104 MMHG | HEIGHT: 67 IN | HEART RATE: 66 BPM | WEIGHT: 153 LBS | DIASTOLIC BLOOD PRESSURE: 68 MMHG | BODY MASS INDEX: 24.01 KG/M2 | OXYGEN SATURATION: 96 % | TEMPERATURE: 97.5 F

## 2020-09-08 DIAGNOSIS — R53.83 FATIGUE, UNSPECIFIED TYPE: ICD-10-CM

## 2020-09-08 DIAGNOSIS — R06.83 SNORING: ICD-10-CM

## 2020-09-08 DIAGNOSIS — F41.9 ANXIETY: ICD-10-CM

## 2020-09-08 DIAGNOSIS — G47.34 NOCTURNAL HYPOXIA: ICD-10-CM

## 2020-09-08 PROCEDURE — 99214 OFFICE O/P EST MOD 30 MIN: CPT | Performed by: FAMILY MEDICINE

## 2020-09-08 ASSESSMENT — FIBROSIS 4 INDEX: FIB4 SCORE: 0.42

## 2020-09-08 NOTE — ASSESSMENT & PLAN NOTE
Was advised by his dentist that he might have sleep apnea. He was given a home test and was told he had hypoxia.  He snores, has frequent awakenings and feels tired for much for much of the day.

## 2020-09-08 NOTE — PROGRESS NOTES
"Subjective:     CC: Diagnoses of Nocturnal hypoxia, Snoring, Fatigue, unspecified type, and Anxiety were pertinent to this visit.    HPI: Patient is a 30 y.o. male established patient who presents today to follow-up on anxiety also with concern regarding sleep apnea.      Nocturnal hypoxia  New problem.  Was advised by his dentist that he might have sleep apnea due to the parents of his teeth. He was given a home test and was told he had hypoxia.  He snores, has frequent awakenings and feels tired for much for much of the day.  He reports that he never feels refreshed upon awakening.    Anxiety  Chronic problem. Generally doing quite well. Still going to therapy regularly.  Currently on sertraline 25 mg.  Would like to continue on current dose.      Past Medical History:   Diagnosis Date   • Allergy    • Anxiety        Social History     Tobacco Use   • Smoking status: Never Smoker   • Smokeless tobacco: Never Used   Substance Use Topics   • Alcohol use: Yes     Comment: social   • Drug use: Yes     Types: Marijuana       Current Outpatient Medications Ordered in Epic   Medication Sig Dispense Refill   • fluticasone (FLONASE) 50 MCG/ACT nasal spray Spray 1 Spray in nose 2 times a day.     • sertraline (ZOLOFT) 25 MG tablet Take 1 Tab by mouth every day. 30 Tab 11   • acetaminophen (TYLENOL) 500 MG Tab Take 500-1,000 mg by mouth every 6 hours as needed.       No current Epic-ordered facility-administered medications on file.        Allergies:  Shellfish allergy    Health Maintenance: Completed    ROS:  Pulm: no sob, no cough  CV: no chest pain, no palpitations      Objective:       Exam:  /68 (BP Location: Right arm, Patient Position: Sitting, BP Cuff Size: Adult long)   Pulse 66   Temp 36.4 °C (97.5 °F) (Temporal)   Ht 1.702 m (5' 7\")   Wt 69.4 kg (153 lb)   SpO2 96%   BMI 23.96 kg/m²  Body mass index is 23.96 kg/m².    General: Normal appearing. No distress.  HEAD: NCAT  EYES: conjunctiva clear, lids " without ptosis, pupils equal and reactive to light  EARS: ears normal shape and contour.  MOUTH: normal dentition   Neck:  Normal ROM  Pulmonary: Normal effort. Normal respiratory rate.  Cardiovascular: Well perfused. No LE edema  Neurologic: Grossly normal, no focal deficits  Skin: Warm and dry.  No obvious lesions.  Musculoskeletal: Normal gait and station.   Psych: Normal mood and affect. Alert and oriented x3. Judgment and insight is normal.      Labs: 2/3/2020 results reviewed and discussed with the patient, questions answered.    Assessment & Plan:     30 y.o. male with the following -     1. Nocturnal hypoxia  New problem.  The patient was found to have findings in his teeth that were concerning for sleep apnea, his dentist gave him a home sleep study, the patient reports to me that it was positive for hypoxia.  Referral placed to sleep study.  He does report regular snoring, chronic fatigue and frequent awakenings.  - REFERRAL TO SLEEP STUDIES    2. Snoring  - REFERRAL TO SLEEP STUDIES    3. Fatigue, unspecified type  - REFERRAL TO SLEEP STUDIES    4. Anxiety  Chronic problem, well-controlled with therapy and Zoloft 25 mg.  Plan to continue current dose.  Plan to follow-up in around 6 months.      Return in about 6 months (around 3/8/2021).    Please note that this dictation was created using voice recognition software. I have made every reasonable attempt to correct obvious errors, but I expect that there are errors of grammar and possibly content that I did not discover before finalizing the note.

## 2021-03-03 ENCOUNTER — SLEEP CENTER VISIT (OUTPATIENT)
Dept: SLEEP MEDICINE | Facility: MEDICAL CENTER | Age: 31
End: 2021-03-03
Payer: COMMERCIAL

## 2021-03-03 VITALS
OXYGEN SATURATION: 97 % | RESPIRATION RATE: 20 BRPM | HEIGHT: 67 IN | SYSTOLIC BLOOD PRESSURE: 124 MMHG | HEART RATE: 65 BPM | WEIGHT: 163.3 LBS | DIASTOLIC BLOOD PRESSURE: 70 MMHG | BODY MASS INDEX: 25.63 KG/M2

## 2021-03-03 DIAGNOSIS — G47.33 OSA (OBSTRUCTIVE SLEEP APNEA): ICD-10-CM

## 2021-03-03 PROCEDURE — 99243 OFF/OP CNSLTJ NEW/EST LOW 30: CPT | Performed by: INTERNAL MEDICINE

## 2021-03-03 RX ORDER — FAMOTIDINE 20 MG
TABLET ORAL
COMMUNITY
End: 2021-10-14

## 2021-03-03 ASSESSMENT — FIBROSIS 4 INDEX: FIB4 SCORE: 0.43

## 2021-03-03 NOTE — PROGRESS NOTES
Chief Complaint   Patient presents with   • New Patient      referred 9/8/20 by Araseli Burns M.D. for Nocturnal hypoxia, Snoring, Fatigue       HPI: This patient is a pleasant 31 y.o. male referred for sleep apnea management.  He has a longtime history of snoring and nonrestorative sleep and was diagnosed with NGUYEN by his dentist and prescribed an oral appliance.  Sleep study results are unavailable.  He states repeat polysomnography with the use of appliance showed lack of efficacy; subsequently his dentist left and he has been off treatment.  In terms of sleep habits, he gets to bed by 10 PM, with a variable sleep latency.  His fiancée notes snoring and witnesses apneas.  Gets up by 9 AM, feeling tired.  He struggles with daytime fatigue. Weight is stable with BMI:25.  He is interested in NGUYEN treatment alternatives.    Past Medical History:   Diagnosis Date   • Allergy    • Anxiety    • Apnea, sleep    • Daytime sleepiness    • Gasping for breath    • Insomnia    • Snoring        Social History     Socioeconomic History   • Marital status: Single     Spouse name: Not on file   • Number of children: Not on file   • Years of education: Not on file   • Highest education level: Not on file   Occupational History   • Not on file   Tobacco Use   • Smoking status: Never Smoker   • Smokeless tobacco: Never Used   Substance and Sexual Activity   • Alcohol use: Yes     Comment: social   • Drug use: Yes     Types: Marijuana   • Sexual activity: Yes     Partners: Female     Comment: engaged   Other Topics Concern   • Not on file   Social History Narrative   • Not on file     Social Determinants of Health     Financial Resource Strain:    • Difficulty of Paying Living Expenses:    Food Insecurity:    • Worried About Running Out of Food in the Last Year:    • Ran Out of Food in the Last Year:    Transportation Needs:    • Lack of Transportation (Medical):    • Lack of Transportation (Non-Medical):    Physical Activity:     • Days of Exercise per Week:    • Minutes of Exercise per Session:    Stress:    • Feeling of Stress :    Social Connections:    • Frequency of Communication with Friends and Family:    • Frequency of Social Gatherings with Friends and Family:    • Attends [employer] Services:    • Active Member of Clubs or Organizations:    • Attends Club or Organization Meetings:    • Marital Status:    Intimate Partner Violence:    • Fear of Current or Ex-Partner:    • Emotionally Abused:    • Physically Abused:    • Sexually Abused:        Family History   Problem Relation Age of Onset   • Lupus Mother    • Depression Mother    • Anxiety disorder Mother    • Anxiety disorder Father    • Hypertension Father    • No Known Problems Sister    • No Known Problems Brother    • No Known Problems Sister        Current Outpatient Medications on File Prior to Visit   Medication Sig Dispense Refill   • Vitamin D, Cholecalciferol, 25 MCG (1000 UT) Cap Take  by mouth.     • fluticasone (FLONASE) 50 MCG/ACT nasal spray Spray 1 Spray in nose 2 times a day.     • acetaminophen (TYLENOL) 500 MG Tab Take 500-1,000 mg by mouth every 6 hours as needed.     • sertraline (ZOLOFT) 25 MG tablet Take 1 Tab by mouth every day. (Patient not taking: Reported on 3/3/2021) 30 Tab 11     No current facility-administered medications on file prior to visit.       Allergies: Shellfish allergy    ROS:   Constitutional: Denies fevers, chills, night sweats, +fatigue, denies weight loss  Eyes: Denies vision loss, pain, drainage, double vision  Ears, Nose, Throat: Denies earache, difficulty hearing, tinnitus, nasal congestion, hoarseness  Cardiovascular: Denies chest pain, tightness, palpitations, orthopnea or edema  Respiratory: Denies shortness of breath, cough, wheezing, hemoptysis  Sleep: Denies daytime sleepiness, snoring, apneas, insomnia, morning headaches  GI: Denies heartburn, dysphagia, nausea, abdominal pain, diarrhea or constipation  : Denies frequent  "urination, hematuria, discharge or painful urination  Musculoskeletal: Denies back pain, painful joints, sore muscles  Neurological: Denies weakness or headaches  Skin: No rashes    /70 (BP Location: Right arm, Patient Position: Sitting, BP Cuff Size: Adult)   Pulse 65   Resp 20   Ht 1.702 m (5' 7\")   Wt 74.1 kg (163 lb 4.8 oz)   SpO2 97%     Physical Exam:  Appearance: Well-nourished, well-developed, in no acute distress  HEENT: Normocephalic, atraumatic, white sclera, PERRLA, oropharynx clear, Mallampati 3  Neck: No adenopathy or masses  Respiratory: no intercostal retractions or accessory muscle use  Lungs auscultation: Clear to auscultation bilaterally  Cardiovascular: Regular rate rhythm. No murmurs, rubs or gallops.  No LE edema  Abdomen: soft, nondistended  Gait: Normal  Digits: No clubbing, cyanosis  Motor: No focal deficits  Orientation: Oriented to time, person and place    Diagnosis:  1. NGUYEN (obstructive sleep apnea)  Polysomnography, 4 or More    -presumptive       Plan:  The patient has snoring, witnessed apneas, associated with nonrestorative sleep and fatigue, with diagnosis of sleep apnea made by his dentist.  He was using an oral appliance which was allegedly ineffective.  We discussed the pathophysiology of sleep apnea, as well as treatment alternatives including CPAP, UPPP or Inspire therapy.  We will proceed with polysomnography for formal evaluation of sleep apnea.  aReturn for after sleep study.      "

## 2021-03-08 ENCOUNTER — SLEEP STUDY (OUTPATIENT)
Dept: SLEEP MEDICINE | Facility: MEDICAL CENTER | Age: 31
End: 2021-03-08
Attending: INTERNAL MEDICINE
Payer: COMMERCIAL

## 2021-03-08 ENCOUNTER — OFFICE VISIT (OUTPATIENT)
Dept: MEDICAL GROUP | Facility: MEDICAL CENTER | Age: 31
End: 2021-03-08
Payer: COMMERCIAL

## 2021-03-08 VITALS
SYSTOLIC BLOOD PRESSURE: 118 MMHG | HEIGHT: 67 IN | BODY MASS INDEX: 24.33 KG/M2 | OXYGEN SATURATION: 97 % | TEMPERATURE: 97.1 F | WEIGHT: 155 LBS | DIASTOLIC BLOOD PRESSURE: 70 MMHG | HEART RATE: 83 BPM

## 2021-03-08 DIAGNOSIS — F41.9 ANXIETY: ICD-10-CM

## 2021-03-08 DIAGNOSIS — Z91.013 ALLERGY TO SHELLFISH: ICD-10-CM

## 2021-03-08 DIAGNOSIS — G47.33 OSA (OBSTRUCTIVE SLEEP APNEA): ICD-10-CM

## 2021-03-08 DIAGNOSIS — G47.34 NOCTURNAL HYPOXIA: ICD-10-CM

## 2021-03-08 PROCEDURE — 99213 OFFICE O/P EST LOW 20 MIN: CPT | Performed by: FAMILY MEDICINE

## 2021-03-08 ASSESSMENT — FIBROSIS 4 INDEX: FIB4 SCORE: 0.43

## 2021-03-08 ASSESSMENT — PATIENT HEALTH QUESTIONNAIRE - PHQ9: CLINICAL INTERPRETATION OF PHQ2 SCORE: 0

## 2021-03-08 NOTE — PROGRESS NOTES
"Subjective:     CC: Diagnoses of Anxiety, Allergy to shellfish, and Nocturnal hypoxia were pertinent to this visit.    HPI: Patient is a 31 y.o. male established patient who presents today to discuss the following.       Anxiety  Chronic problem. Still doing every other week therapy sessions. Doing quite well. Off of the sertraline now.     Allergy to shellfish  Chronic problem. The patient does have an epi pen. Worried about covid vaccine.     Nocturnal hypoxia  Chronic problem. The patient has a sleep study today. Being followed by sleep medicine.       Past Medical History:   Diagnosis Date   • Allergy    • Anxiety    • Apnea, sleep    • Daytime sleepiness    • Gasping for breath    • Insomnia    • Snoring        Social History     Tobacco Use   • Smoking status: Never Smoker   • Smokeless tobacco: Never Used   Substance Use Topics   • Alcohol use: Yes     Comment: social   • Drug use: Yes     Types: Marijuana       Current Outpatient Medications Ordered in Epic   Medication Sig Dispense Refill   • Vitamin D, Cholecalciferol, 25 MCG (1000 UT) Cap Take  by mouth.     • fluticasone (FLONASE) 50 MCG/ACT nasal spray Spray 1 Spray in nose 2 times a day.     • acetaminophen (TYLENOL) 500 MG Tab Take 500-1,000 mg by mouth every 6 hours as needed.       No current Epic-ordered facility-administered medications on file.       Allergies:  Shellfish allergy    Health Maintenance: Completed    ROS:  Pulm: no sob, no cough  CV: no chest pain, no palpitations      Objective:       Exam:  /70 (BP Location: Left arm, Patient Position: Sitting, BP Cuff Size: Adult long)   Pulse 83   Temp 36.2 °C (97.1 °F) (Temporal)   Ht 1.702 m (5' 7\")   Wt 70.3 kg (155 lb)   SpO2 97%   BMI 24.28 kg/m²  Body mass index is 24.28 kg/m².    General: Normal appearing. No distress.  HEAD: NCAT  EYES: conjunctiva clear, lids without ptosis, pupils equal and reactive to light  EARS: ears normal shape and contour.  MOUTH: normal dentition "   Neck:  Normal ROM  Pulmonary: CTAB, no W/R/R. Normal effort. Normal respiratory rate.  Cardiovascular: RRR, no M/R/G. Well perfused. No LE edema  Neurologic: Grossly normal, no focal deficits  Skin: Warm and dry.  No obvious lesions.  Musculoskeletal: Normal gait and station.   Psych: Normal mood and affect. Alert and oriented x3. Judgment and insight is normal.     Labs: 2/3/20 Results reviewed and discussed with the patient, questions answered.    Assessment & Plan:     31 y.o. male with the following -     1. Anxiety  Chronic problem. Continues to improve. Now off the sertraline. Still going to therapy every other week.     2. Allergy to shellfish  Chronic problem. The patient does have an epipen. Advised to bring this with him when he gets his covid vaccine. Also recommended he let the nurse know about his allergy before getting the vaccine. Advised there is no absolute contraindication of the covid vaccine.     3. Nocturnal hypoxia  Chronic problem. Now being followed by sleep medicine. Has sleep study scheduled for this evening.     No follow-ups on file.    Please note that this dictation was created using voice recognition software. I have made every reasonable attempt to correct obvious errors, but I expect that there are errors of grammar and possibly content that I did not discover before finalizing the note.

## 2021-03-08 NOTE — ASSESSMENT & PLAN NOTE
Chronic problem. Still doing every other week therapy sessions. Doing quite well. Off of the sertraline now.

## 2021-03-09 NOTE — PROCEDURES
Comments:  The patient underwent a diagnostic polysomnogram using the standard montage for measurement of parameters of sleep, respiratory events, movement abnormalities, and heart rate and rhythm.   A microphone was used to monitor snoring.  Interpretation:  Study start time was 08:46:42 PM. Diagnostic recording time was 9h 18.0m with a total sleep time of 3h 38.0m resulting in a sleep efficiency of 39.07%%.   Sleep latency from the start of the study was 327 minutes and the latency from sleep to REM was 89 minutes.  In total,32 arousals were scored for an arousal index of 8.8.  Respiratory:  There were a total of 0 apneas consisting of 0 obstructive apneas, 0 mixed apneas, and 0 central apneas. A total of 25 hypopneas were scored.  The apnea index was 0.00 per hour and the hypopnea index was 6.88 per hour resulting in an overall AHI of 6.88.  AHI during rem was 20.2 and AHI while supine was 9.38.  Oximetry:  There was a mean oxygen saturation of 94.0% with a minimum oxygen saturation of 88.0%. Time spent with oxygen saturations below 89% was 0.1 minutes.  Cardiac:  The highest heart rate seen while awake was 80 BPM while the highest heart rate during sleep was 81 BPM with an average sleeping heart rate of 51 BPM.  Limb Movements:  There were a total of 0 PLMs during sleep, of which 0 were PLMS arousals. This resulted in a PLMS index of 0.0 and a PLMS arousal index of 0.0.    The sleep efficiency was 39%.  Sleep architecture showed reduced stage III sleep.  Sleep latency was prolonged at 327 minutes.  No arrhythmias or periodic limb movements observed.    Once asleep occasional mild snoring was heard associated with obstructive hypopneas.  Overall AHI was 6.9/h.  There was no significant associated oxygen desaturations noted.    Split-night criteria was not met.    Interpretation:  Mild NGUYEN, AHI, 6.9/h with no associated oxygen desaturations.  Sleep apnea may have been underestimated due to reduced sleep  time.  Poor sleep efficiency.    Recommendations:  Treatment options for mild NGUYEN include CPAP, oral appliance or UPPP.

## 2021-03-10 ENCOUNTER — TELEPHONE (OUTPATIENT)
Dept: SLEEP MEDICINE | Facility: MEDICAL CENTER | Age: 31
End: 2021-03-10

## 2021-03-12 PROCEDURE — 95810 POLYSOM 6/> YRS 4/> PARAM: CPT | Performed by: INTERNAL MEDICINE

## 2021-04-06 DIAGNOSIS — G47.33 OSA (OBSTRUCTIVE SLEEP APNEA): ICD-10-CM

## 2021-04-06 DIAGNOSIS — R06.83 SNORING: ICD-10-CM

## 2021-04-12 ENCOUNTER — OFFICE VISIT (OUTPATIENT)
Dept: SLEEP MEDICINE | Facility: MEDICAL CENTER | Age: 31
End: 2021-04-12
Payer: COMMERCIAL

## 2021-04-12 VITALS
SYSTOLIC BLOOD PRESSURE: 100 MMHG | BODY MASS INDEX: 24.69 KG/M2 | HEIGHT: 67 IN | OXYGEN SATURATION: 97 % | WEIGHT: 157.3 LBS | DIASTOLIC BLOOD PRESSURE: 64 MMHG | RESPIRATION RATE: 20 BRPM | HEART RATE: 84 BPM

## 2021-04-12 DIAGNOSIS — G47.33 OSA (OBSTRUCTIVE SLEEP APNEA): ICD-10-CM

## 2021-04-12 PROCEDURE — 99213 OFFICE O/P EST LOW 20 MIN: CPT | Performed by: INTERNAL MEDICINE

## 2021-04-12 ASSESSMENT — FIBROSIS 4 INDEX: FIB4 SCORE: 0.43

## 2021-04-12 NOTE — PROGRESS NOTES
Chief Complaint   Patient presents with   • New Patient     NP referred 9/8/20 by Araseli Burns M.D. for Nocturnal hypoxia, Snoring, Fatigue       HPI: This patient is a 31 y.o. male who returns for sleep study results. He has a longtime history of snoring and nonrestorative sleep and was diagnosed with NGUYEN by his dentist and prescribed an oral appliance. He states repeat polysomnography with the use of appliance showed lack of efficacy; subsequently his dentist left and he has been off treatment.  In terms of sleep habits, he gets to bed by 10 PM, with a variable sleep latency.  His fiancée notes snoring and witnesses apneas.  Gets up by 9 AM, feeling tired.  He struggles with daytime fatigue.   He underwent polysomnography in March. 2021 showing poor sleep efficiency at 39% and at least mild NGUYEN, AHI: 6.9/h.  No significant associated oxygen desaturations were noted.  Sleep apnea may have been underestimated due to reduced sleep time.  We discussed treatment with CPAP which he is amenable to.      Past Medical History:   Diagnosis Date   • Allergy    • Anxiety    • Apnea, sleep    • Daytime sleepiness    • Gasping for breath    • Insomnia    • Snoring        Social History     Socioeconomic History   • Marital status: Single     Spouse name: Not on file   • Number of children: Not on file   • Years of education: Not on file   • Highest education level: Not on file   Occupational History   • Not on file   Tobacco Use   • Smoking status: Never Smoker   • Smokeless tobacco: Never Used   Substance and Sexual Activity   • Alcohol use: Yes     Comment: social   • Drug use: Yes     Types: Marijuana   • Sexual activity: Yes     Partners: Female     Comment: engaged   Other Topics Concern   • Not on file   Social History Narrative   • Not on file     Social Determinants of Health     Financial Resource Strain:    • Difficulty of Paying Living Expenses:    Food Insecurity:    • Worried About Running Out of Food in the  Last Year:    • Ran Out of Food in the Last Year:    Transportation Needs:    • Lack of Transportation (Medical):    • Lack of Transportation (Non-Medical):    Physical Activity:    • Days of Exercise per Week:    • Minutes of Exercise per Session:    Stress:    • Feeling of Stress :    Social Connections:    • Frequency of Communication with Friends and Family:    • Frequency of Social Gatherings with Friends and Family:    • Attends Buddhist Services:    • Active Member of Clubs or Organizations:    • Attends Club or Organization Meetings:    • Marital Status:    Intimate Partner Violence:    • Fear of Current or Ex-Partner:    • Emotionally Abused:    • Physically Abused:    • Sexually Abused:        Family History   Problem Relation Age of Onset   • Lupus Mother    • Depression Mother    • Anxiety disorder Mother    • Anxiety disorder Father    • Hypertension Father    • No Known Problems Sister    • No Known Problems Brother    • No Known Problems Sister        Current Outpatient Medications on File Prior to Visit   Medication Sig Dispense Refill   • Vitamin D, Cholecalciferol, 25 MCG (1000 UT) Cap Take  by mouth.     • fluticasone (FLONASE) 50 MCG/ACT nasal spray Spray 1 Spray in nose 2 times a day.     • acetaminophen (TYLENOL) 500 MG Tab Take 500-1,000 mg by mouth every 6 hours as needed.       No current facility-administered medications on file prior to visit.       Allergies: Shellfish allergy    ROS:   Constitutional: Denies fevers, chills, night sweats, fatigue or weight loss  Eyes: Denies vision loss, pain, drainage, double vision  Ears, Nose, Throat: Denies earache, difficulty hearing, tinnitus, nasal congestion, hoarseness  Cardiovascular: Denies chest pain, tightness, palpitations, orthopnea or edema  Respiratory: Denies shortness of breath, cough, wheezing, hemoptysis  Sleep: As in HPI   GI: Denies heartburn, dysphagia, nausea, abdominal pain, diarrhea or constipation  : Denies frequent  "urination, hematuria, discharge or painful urination  Musculoskeletal: Denies back pain, painful joints, sore muscles  Neurological: Denies weakness or headaches  Skin: No rashes    /64   Pulse 84   Resp 20   Ht 1.702 m (5' 7\")   Wt 71.4 kg (157 lb 4.8 oz)   SpO2 97%     Physical Exam:  Appearance: Well-nourished, well-developed, in no acute distress  HEENT: Normocephalic, atraumatic, white sclera, PERRLA, Mallampati 3  Neck: No masses  Respiratory: no intercostal retractions or accessory muscle use  Lungs auscultation: No audible wheezing  Cardiovascular: No LE edema  Abdomen: Nondistended  Gait: Normal  Digits: No clubbing, cyanosis  Motor: No focal deficits  Orientation: Oriented to time, person and place    Diagnosis:  1. NGUYEN (obstructive sleep apnea)  DME CPAP       Plan:  The patient has at least mild NGUYEN and AHI:6.9/h (may have been underestimated due to reduced sleep time), associated with nonrestorative sleep and daytime hypersomnolence and would benefit from treatment.  He has failed oral appliance therapy and is amenable to CPAP use.  He will start AutoPap: 5-15 cm H20, with mask of choice with download of compliance data within 8 weeks on AutoPap therapy to monitor response to treatment.  Return in about 3 months (around 7/12/2021).      "

## 2021-07-16 DIAGNOSIS — G47.33 OSA (OBSTRUCTIVE SLEEP APNEA): ICD-10-CM

## 2021-08-18 ENCOUNTER — SLEEP CENTER VISIT (OUTPATIENT)
Dept: SLEEP MEDICINE | Facility: MEDICAL CENTER | Age: 31
End: 2021-08-18
Payer: COMMERCIAL

## 2021-08-18 VITALS
BODY MASS INDEX: 23.4 KG/M2 | HEART RATE: 72 BPM | SYSTOLIC BLOOD PRESSURE: 116 MMHG | OXYGEN SATURATION: 94 % | WEIGHT: 154.4 LBS | HEIGHT: 68 IN | RESPIRATION RATE: 16 BRPM | DIASTOLIC BLOOD PRESSURE: 60 MMHG

## 2021-08-18 DIAGNOSIS — G47.33 OSA (OBSTRUCTIVE SLEEP APNEA): ICD-10-CM

## 2021-08-18 PROCEDURE — 99213 OFFICE O/P EST LOW 20 MIN: CPT | Performed by: NURSE PRACTITIONER

## 2021-08-18 ASSESSMENT — FIBROSIS 4 INDEX: FIB4 SCORE: 0.43

## 2021-08-18 NOTE — PROGRESS NOTES
Chief Complaint   Patient presents with   • Follow-Up     NGUYEN       HPI:  Baron Smith is a 31 y.o. year old male here today for follow-up on NGUYEN.  Last seen on 4/12/2021 by Dr Doe.  He has a longtime history of snoring and nonrestorative sleep and was diagnosed with NGUYEN by his dentist and prescribed an oral appliance. He states repeat polysomnography with the use of appliance showed lack of efficacy; subsequently his dentist left and he has been off treatment.    His fiancée notes snoring and witnesses apneas. He struggles with daytime fatigue.   He underwent polysomnography in March. 2021 showing poor sleep efficiency at 39% and at least mild NGUYEN, AHI: 6.9/h.  No significant associated oxygen desaturations were noted.  Sleep apnea may have been underestimated due to reduced sleep time.    At last visit he was started on auto CPAP at 5 to 15 cm/H2O.  The his his initial 3-month follow-up.      He states he is doing fine on CPAP therapy.  He states that he initially was experiencing significant sleepiness throughout the day, but that is improved since starting on the machine.  He still does endorse trouble waking up in the morning, but understands it may take time to get used to the machine.  He denies any excessive daytime sleepiness, morning headaches, palpitations, or concentration or memory problems.  Compliance report was reviewed with patient and does show 100% usage with an average time of 8 hours and 24 minutes with a resultant AHI of 2.6.  There is no evidence of excessive leakage noted on compliance report.  Bedtime habits include going to sleep at 10 PM and waking up around 9 AM.      ROS: As per HPI and otherwise negative if not stated.    Past Medical History:   Diagnosis Date   • Allergy    • Anxiety    • Apnea, sleep    • Daytime sleepiness    • Gasping for breath    • Insomnia    • Snoring        Past Surgical History:   Procedure Laterality Date   • DENTAL EXTRACTION(S)         Family  "History   Problem Relation Age of Onset   • Lupus Mother    • Depression Mother    • Anxiety disorder Mother    • Anxiety disorder Father    • Hypertension Father    • No Known Problems Sister    • No Known Problems Brother    • No Known Problems Sister        Allergies as of 08/18/2021 - Reviewed 08/18/2021   Allergen Reaction Noted   • Shellfish allergy  11/13/2019        Vitals:  /60 (BP Location: Left arm, Patient Position: Sitting, BP Cuff Size: Adult)   Pulse 72   Resp 16   Ht 1.727 m (5' 8\")   Wt 70 kg (154 lb 6.4 oz)   SpO2 94%     Current medications as of today   Current Outpatient Medications   Medication Sig Dispense Refill   • Vitamin D, Cholecalciferol, 25 MCG (1000 UT) Cap Take  by mouth.     • fluticasone (FLONASE) 50 MCG/ACT nasal spray Spray 1 Spray in nose 2 times a day.     • acetaminophen (TYLENOL) 500 MG Tab Take 500-1,000 mg by mouth every 6 hours as needed.       No current facility-administered medications for this visit.         Physical Exam:   Gen:           Alert and oriented, No apparent distress. Mood and affect appropriate, normal interaction with examiner.  Eyes:          PERRL, EOM intact, sclere white, conjunctive moist.  Ears:          Not examined.   Hearing:     Grossly intact.  Nose:          Normal, no lesions or deformities.  Dentition:    Good dentition.  Oropharynx:   Tongue normal, posterior pharynx without erythema or exudate  Neck:        Supple, trachea midline, no masses.  Respiratory Effort: No intercostal retractions or use of accessory muscles.   Lung Auscultation:      Clear to auscultation bilaterally; no rales, rhonchi or wheezing.  CV:            Regular rate and rhythm. No murmurs, rubs or gallops.  Abd:           Not examined.   Lymphadenopathy: Not examined.  Gait and Station: Normal.  Digits and Nails: No clubbing, cyanosis, petechiae, or nodes.   Cranial Nerves: II-XII grossly intact.  Skin:        No rashes, lesions or ulcers noted.             "   Ext:           No cyanosis or edema.      Assessment:  1. NGUYEN (obstructive sleep apnea)         Immunizations:    COVID-19: 4/6/2021, David SARS-CoV-2    Plan:  1.  Continue using auto CPAP at 5 to 15 cm/H2O.  Clean mask and supplies frequently with mild soap and water or vinegar water solution.  Avoid ozone  as they may degrade plastics more rapidly than traditional .  If you experience mask fitting issues over the course of the year, please request mask fitting session through this office to be sent to [facility].  Reach out through [employer] for any questions or concerns before next appointment.    Please note that this dictation was created using voice recognition software. I have made every reasonable attempt to correct obvious errors, but it is possible there are errors of grammar and possibly content that I did not discover before finalizing the note.

## 2021-08-18 NOTE — PATIENT INSTRUCTIONS
1.  Continue using auto CPAP at 5 to 15 cm/H2O.  Clean mask and supplies frequently with mild soap and water or vinegar water solution.  Avoid ozone  as they may degrade plastics more rapidly than traditional .  If you experience mask fitting issues over the course of the year, please request mask fitting session through this office to be sent to DME company.  Reach out through [employer] for any questions or concerns before next appointment.

## 2021-10-14 ENCOUNTER — OFFICE VISIT (OUTPATIENT)
Dept: URGENT CARE | Facility: CLINIC | Age: 31
End: 2021-10-14
Payer: COMMERCIAL

## 2021-10-14 VITALS
RESPIRATION RATE: 16 BRPM | WEIGHT: 153.4 LBS | BODY MASS INDEX: 24.08 KG/M2 | TEMPERATURE: 98.4 F | HEART RATE: 92 BPM | HEIGHT: 67 IN | OXYGEN SATURATION: 98 % | DIASTOLIC BLOOD PRESSURE: 80 MMHG | SYSTOLIC BLOOD PRESSURE: 118 MMHG

## 2021-10-14 DIAGNOSIS — R42 VERTIGO: ICD-10-CM

## 2021-10-14 PROCEDURE — 99213 OFFICE O/P EST LOW 20 MIN: CPT | Performed by: NURSE PRACTITIONER

## 2021-10-14 RX ORDER — MECLIZINE HYDROCHLORIDE 25 MG/1
25 TABLET ORAL 3 TIMES DAILY PRN
Qty: 30 TABLET | Refills: 0 | Status: SHIPPED | OUTPATIENT
Start: 2021-10-14 | End: 2022-07-15

## 2021-10-14 ASSESSMENT — ENCOUNTER SYMPTOMS: DIZZINESS: 1

## 2021-10-14 ASSESSMENT — FIBROSIS 4 INDEX: FIB4 SCORE: 0.43

## 2021-10-14 NOTE — PROGRESS NOTES
Subjective     Baron Smith is a 31 y.o. male who presents with Dizziness (x 9 days, dizziness and head pressure)    Past Medical History:   Diagnosis Date   • Allergy    • Anxiety    • Apnea, sleep    • Daytime sleepiness    • Gasping for breath    • Insomnia    • Snoring      Social History     Socioeconomic History   • Marital status: Single     Spouse name: Not on file   • Number of children: Not on file   • Years of education: Not on file   • Highest education level: Not on file   Occupational History   • Not on file   Tobacco Use   • Smoking status: Never Smoker   • Smokeless tobacco: Never Used   Vaping Use   • Vaping Use: Some days   • Start date: 3/2/2018   • Substances: Nicotine   • Devices: Refillable tank   Substance and Sexual Activity   • Alcohol use: Yes     Comment: social   • Drug use: Yes     Types: Marijuana   • Sexual activity: Yes     Partners: Female     Comment: engaged   Other Topics Concern   • Not on file   Social History Narrative   • Not on file     Social Determinants of Health     Financial Resource Strain:    • Difficulty of Paying Living Expenses:    Food Insecurity:    • Worried About Running Out of Food in the Last Year:    • Ran Out of Food in the Last Year:    Transportation Needs:    • Lack of Transportation (Medical):    • Lack of Transportation (Non-Medical):    Physical Activity:    • Days of Exercise per Week:    • Minutes of Exercise per Session:    Stress:    • Feeling of Stress :    Social Connections:    • Frequency of Communication with Friends and Family:    • Frequency of Social Gatherings with Friends and Family:    • Attends Yazidism Services:    • Active Member of Clubs or Organizations:    • Attends Club or Organization Meetings:    • Marital Status:    Intimate Partner Violence:    • Fear of Current or Ex-Partner:    • Emotionally Abused:    • Physically Abused:    • Sexually Abused:      Family History   Problem Relation Age of Onset   • Lupus Mother    •  "Depression Mother    • Anxiety disorder Mother    • Anxiety disorder Father    • Hypertension Father    • No Known Problems Sister    • No Known Problems Brother    • No Known Problems Sister        Allergies: Shellfish allergy    This patient is a 31-year-old male who presents today with complaint of dizziness.  He describes this as a sensation of unsteadiness as though he were on a boat.  Patient states symptoms started about 9 days ago.  States they were in [place] and [place] for [employer].  Patient states about longterm through the trip he began to feel somewhat dizzy.  He states they did fly out.  He has been having some fullness with popping and ringing to the right ear.  Mild frontal sinus tenderness.  No nasal congestion or sinus drainage.  No cough or other respiratory symptoms.  No fever, aches, or chills.  He states mild nausea but no vomiting.        Other  This is a new problem. The current episode started 1 to 4 weeks ago. The problem occurs intermittently. The problem has been unchanged. Nothing aggravates the symptoms. He has tried nothing for the symptoms. The treatment provided no relief.       Review of Systems   Neurological: Positive for dizziness.   All other systems reviewed and are negative.             Objective     /80 (BP Location: Left arm, Patient Position: Sitting, BP Cuff Size: Adult)   Pulse 92   Temp 36.9 °C (98.4 °F) (Temporal)   Resp 16   Ht 1.702 m (5' 7\")   Wt 69.6 kg (153 lb 6.4 oz)   SpO2 98%   BMI 24.03 kg/m²      Physical Exam  Vitals reviewed.   Constitutional:       Appearance: Normal appearance.   HENT:      Head: Normocephalic and atraumatic.      Right Ear: Tympanic membrane, ear canal and external ear normal.      Left Ear: Tympanic membrane, ear canal and external ear normal.      Nose: Nose normal.      Mouth/Throat:      Mouth: Mucous membranes are moist.   Eyes:      General: No visual field deficit.     Extraocular Movements: Extraocular " movements intact.      Conjunctiva/sclera: Conjunctivae normal.      Pupils: Pupils are equal, round, and reactive to light.   Cardiovascular:      Rate and Rhythm: Normal rate and regular rhythm.   Pulmonary:      Breath sounds: Normal breath sounds.   Musculoskeletal:         General: Normal range of motion.      Cervical back: Normal range of motion and neck supple.   Skin:     General: Skin is warm and dry.   Neurological:      Mental Status: He is alert and oriented to person, place, and time.      GCS: GCS eye subscore is 4. GCS verbal subscore is 5. GCS motor subscore is 6.      Cranial Nerves: No cranial nerve deficit, dysarthria or facial asymmetry.      Sensory: Sensation is intact. No sensory deficit.      Motor: No weakness, tremor, atrophy, abnormal muscle tone or seizure activity.      Coordination: Coordination is intact. Romberg sign negative. Coordination normal. Finger-Nose-Finger Test normal.      Gait: Gait normal.      Deep Tendon Reflexes: Reflexes normal.      Comments: Cranial nerves II through XII intact.  Cerebellar function intact.  Motor coordination intact.  Romberg negative, no pronator drift.  Proprioception intact.  Pupils equally round and reactive, EOMs intact.  Facial features symmetric with equal movement.  Shoulder shrug is equal.   5/5 and equal in the upper extremities.  Strength 5/5 and equal in the upper and lower extremities.  Gait is even and steady.  Patient is awake, alert, and oriented x3.  Sensation intact.   Psychiatric:         Mood and Affect: Mood normal.         Behavior: Behavior normal.       Colfax-Hallpike maneuver reproduces dizziness to the right.                      Assessment & Plan   Vertigo    Antivert  Flonase  ER precautions for ataxia or worsening of sypmtoms  Referral given to ENT at patient's request to use for persistent symptoms  May return to urgent care otherwise at any time for any further questions or concerns     There are no diagnoses  linked to this encounter.

## 2021-10-18 ENCOUNTER — OFFICE VISIT (OUTPATIENT)
Dept: MEDICAL GROUP | Facility: MEDICAL CENTER | Age: 31
End: 2021-10-18
Payer: COMMERCIAL

## 2021-10-18 VITALS
HEIGHT: 67 IN | DIASTOLIC BLOOD PRESSURE: 70 MMHG | BODY MASS INDEX: 24.01 KG/M2 | OXYGEN SATURATION: 95 % | WEIGHT: 153 LBS | HEART RATE: 91 BPM | SYSTOLIC BLOOD PRESSURE: 116 MMHG | TEMPERATURE: 98.4 F

## 2021-10-18 DIAGNOSIS — R42 VERTIGO: ICD-10-CM

## 2021-10-18 PROCEDURE — 99213 OFFICE O/P EST LOW 20 MIN: CPT | Performed by: FAMILY MEDICINE

## 2021-10-18 RX ORDER — COVID-19 MOLECULAR TEST ASSAY
KIT MISCELLANEOUS
COMMUNITY
Start: 2021-09-07 | End: 2021-10-18

## 2021-10-18 ASSESSMENT — FIBROSIS 4 INDEX: FIB4 SCORE: 0.43

## 2021-10-18 NOTE — ASSESSMENT & PLAN NOTE
New problem.   Noticies in the afternoon  Better for the past couple days  Better when walking  Worst when he moves his head or bends over.   Feels like he's on a boat, rocking

## 2021-10-18 NOTE — PROGRESS NOTES
"Subjective:     CC: The encounter diagnosis was Vertigo.    HPI: Patient is a 31 y.o. male established patient who presents today for f/u on vertigo.       Vertigo  New problem. Patient present to urgent care on 10/14/21 with vertigo symptoms. He reports he has now had symptoms for about 2 weeks.   Generally worst in the afternoon.   Better for the past couple days, almost not symptoms at all.   Better when walking  Worst when he moves his head or bends over.   Feels like he's on a boat, rocking  Denies any hearing loss or tinnitus.   Denies any neurological symptoms.       Past Medical History:   Diagnosis Date   • Allergy    • Anxiety    • Apnea, sleep    • Daytime sleepiness    • Gasping for breath    • Insomnia    • Snoring        Social History     Tobacco Use   • Smoking status: Never Smoker   • Smokeless tobacco: Never Used   Vaping Use   • Vaping Use: Some days   • Start date: 3/2/2018   • Substances: Nicotine   • Devices: RefScaleformble tank   Substance Use Topics   • Alcohol use: Yes     Comment: social   • Drug use: Yes     Types: Marijuana       Current Outpatient Medications Ordered in Epic   Medication Sig Dispense Refill   • meclizine (ANTIVERT) 25 MG Tab Take 1 Tablet by mouth 3 times a day as needed. 30 Tablet 0     No current Epic-ordered facility-administered medications on file.       Allergies:  Shellfish allergy    Health Maintenance: Completed    ROS:  Pulm: no sob, no cough  CV: no chest pain, no palpitations      Objective:       Exam:  /70 (BP Location: Right arm, Patient Position: Sitting, BP Cuff Size: Adult long)   Pulse 91   Temp 36.9 °C (98.4 °F) (Temporal)   Ht 1.702 m (5' 7\")   Wt 69.4 kg (153 lb)   SpO2 95%   BMI 23.96 kg/m²  Body mass index is 23.96 kg/m².    General: Normal appearing. No distress.  HEAD: NCAT  EYES: conjunctiva clear, lids without ptosis, pupils equal and reactive to light  EARS: ears normal shape and contour.  MOUTH: normal dentition   Neck:  Normal " ROM  Pulmonary: Normal effort. Normal respiratory rate.  Cardiovascular: Well perfused. No LE edema  Neurologic: Grossly normal, no focal deficits  Skin: Warm and dry.  No obvious lesions.  Musculoskeletal: Normal gait and station.   Psych: Normal mood and affect. Alert and oriented x3. Judgment and insight is normal.     Assessment & Plan:     31 y.o. male with the following -     1. Vertigo  Reviewed urgent care visit. Symptoms consistent with BPPV. Seem to be improving already. No red flag symptoms. No hearing loss or tinnitus.   Gave hand out for epley maneuver to do at home if symptoms recur.   Has Rx for meclizine if needed but causes quite a bit of drowsiness.   Plan to f/u if needed.       Return if symptoms worsen or fail to improve.    Please note that this dictation was created using voice recognition software. I have made every reasonable attempt to correct obvious errors, but I expect that there are errors of grammar and possibly content that I did not discover before finalizing the note.

## 2022-05-16 ENCOUNTER — OFFICE VISIT (OUTPATIENT)
Dept: SLEEP MEDICINE | Facility: MEDICAL CENTER | Age: 32
End: 2022-05-16
Payer: COMMERCIAL

## 2022-05-16 VITALS
SYSTOLIC BLOOD PRESSURE: 112 MMHG | BODY MASS INDEX: 25.43 KG/M2 | DIASTOLIC BLOOD PRESSURE: 68 MMHG | OXYGEN SATURATION: 96 % | WEIGHT: 162 LBS | RESPIRATION RATE: 16 BRPM | HEART RATE: 68 BPM | HEIGHT: 67 IN

## 2022-05-16 DIAGNOSIS — G47.33 OSA (OBSTRUCTIVE SLEEP APNEA): ICD-10-CM

## 2022-05-16 PROCEDURE — 99213 OFFICE O/P EST LOW 20 MIN: CPT | Performed by: NURSE PRACTITIONER

## 2022-05-16 ASSESSMENT — PATIENT HEALTH QUESTIONNAIRE - PHQ9: CLINICAL INTERPRETATION OF PHQ2 SCORE: 0

## 2022-05-16 NOTE — PROGRESS NOTES
Chief Complaint   Patient presents with   • Apnea       HPI:  Baron Smith is a 32 y.o. year old male here today for follow-up on NGUYEN.  Last seen 8/18/2021 by me.  Sleep history includes snoring and nonrestorative sleep.  Diagnosed with NGUYEN by his dentist and prescribed oral appliance. He states repeat polysomnography with the use of appliance showed lack of efficacy; subsequently his dentist left and he has been off treatment.    His fiancée notes snoring and witnesses apneas. He struggles with daytime fatigue.   He underwent polysomnography in March. 2021 showing poor sleep efficiency at 39% and at least mild NGUYEN, AHI: 6.9/h.  No significant associated oxygen desaturations were noted.  Sleep apnea may have been underestimated due to reduced sleep time. He was started on auto CPAP at 5 to 15 cm/H2O.   patient noted improvement and sleep quality with using CPAP.  Especially with his energy levels throughout the day and his fatigue have significantly improved.    Patient denies any difficulty with mask fit or pressures at this time.  He is using a fullface mask.  He denies any excessive daytime sleepiness, morning headaches, or palpitations.  He does state on some days that he is more tired, but denies need for naps or denies falling asleep unintentionally.  On average he is getting between 7 to 8 hours of sleep nightly.  He does find his sleep restorative.    Compliance was reviewed with patient and does show 87% use with an average time of 7 hours and 52 minutes and a resultant AHI of 2.2.  There is no evidence of excessive leakage.  Current settings is 5 to 15 cm/H2O on ResMed device.  Median pressure is 6.8 and 95th percentile of 9.4.    ROS: As per HPI and otherwise negative if not stated.    Past Medical History:   Diagnosis Date   • Allergy    • Anxiety    • Apnea, sleep    • Daytime sleepiness    • Gasping for breath    • Insomnia    • Snoring        Past Surgical History:   Procedure Laterality Date  "  • DENTAL EXTRACTION(S)         Family History   Problem Relation Age of Onset   • Lupus Mother    • Depression Mother    • Anxiety disorder Mother    • Anxiety disorder Father    • Hypertension Father    • No Known Problems Sister    • No Known Problems Brother    • No Known Problems Sister        Allergies as of 05/16/2022 - Reviewed 05/16/2022   Allergen Reaction Noted   • Shellfish allergy  11/13/2019        Vitals:  /68 (BP Location: Left arm, Patient Position: Sitting, BP Cuff Size: Adult)   Pulse 68   Resp 16   Ht 1.702 m (5' 7\")   Wt 73.5 kg (162 lb)   SpO2 96%     Current medications as of today   Current Outpatient Medications   Medication Sig Dispense Refill   • meclizine (ANTIVERT) 25 MG Tab Take 1 Tablet by mouth 3 times a day as needed. (Patient not taking: Reported on 5/16/2022) 30 Tablet 0     No current facility-administered medications for this visit.         Physical Exam:   Gen:           Alert and oriented, No apparent distress. Mood and affect appropriate, normal interaction with examiner.  Eyes:          PERRL, EOM intact, sclere white, conjunctive moist.  Ears:          Not examined.   Hearing:     Grossly intact.  Nose:          Normal, no lesions or deformities.  Dentition:    Good dentition.  Oropharynx:   Tongue normal, posterior pharynx without erythema or exudate.  Neck:        Supple, trachea midline, no masses.  Respiratory Effort: No intercostal retractions or use of accessory muscles.   Lung Auscultation:      Clear to auscultation bilaterally; no rales, rhonchi or wheezing.  CV:            Regular rate and rhythm. No murmurs, rubs or gallops.  Abd:           Not examined.   Lymphadenopathy: Not examined.  Gait and Station: Normal.  Digits and Nails: No clubbing, cyanosis, petechiae, or nodes.   Cranial Nerves: II-XII grossly intact.  Skin:        No rashes, lesions or ulcers noted.               Ext:           No cyanosis or edema.      Assessment:  1. NGUYEN (obstructive " sleep apnea)       Plan:  1.  Patient is using and benefiting from CPAP therapy at this time.  Compliance shows adequate control of NGUYEN with a resultant AHI of 2.2.  Patient denies any difficulty with mask fit or pressures.  Order placed for mask and supplies which will be good for 1 year.  Please follow-up annually, or sooner if needed.    Please note that this dictation was created using voice recognition software. I have made every reasonable attempt to correct obvious errors, but it is possible there are errors of grammar and possibly content that I did not discover before finalizing the note.

## 2022-07-15 ENCOUNTER — OFFICE VISIT (OUTPATIENT)
Dept: MEDICAL GROUP | Facility: MEDICAL CENTER | Age: 32
End: 2022-07-15
Payer: COMMERCIAL

## 2022-07-15 VITALS
DIASTOLIC BLOOD PRESSURE: 70 MMHG | HEIGHT: 67 IN | HEART RATE: 68 BPM | BODY MASS INDEX: 24.96 KG/M2 | TEMPERATURE: 97.2 F | OXYGEN SATURATION: 99 % | WEIGHT: 159 LBS | SYSTOLIC BLOOD PRESSURE: 124 MMHG

## 2022-07-15 DIAGNOSIS — F41.9 ANXIETY: ICD-10-CM

## 2022-07-15 DIAGNOSIS — Z13.220 LIPID SCREENING: ICD-10-CM

## 2022-07-15 DIAGNOSIS — Z00.00 WELL ADULT EXAM: ICD-10-CM

## 2022-07-15 DIAGNOSIS — F90.9 ATTENTION DEFICIT HYPERACTIVITY DISORDER (ADHD), UNSPECIFIED ADHD TYPE: ICD-10-CM

## 2022-07-15 DIAGNOSIS — R41.840 INATTENTION: ICD-10-CM

## 2022-07-15 DIAGNOSIS — G47.33 OSA (OBSTRUCTIVE SLEEP APNEA): ICD-10-CM

## 2022-07-15 PROBLEM — R07.89 CHEST TIGHTNESS: Status: RESOLVED | Noted: 2020-03-17 | Resolved: 2022-07-15

## 2022-07-15 PROCEDURE — 99214 OFFICE O/P EST MOD 30 MIN: CPT | Performed by: FAMILY MEDICINE

## 2022-07-15 RX ORDER — BUPROPION HYDROCHLORIDE 150 MG/1
150 TABLET ORAL EVERY MORNING
Qty: 30 TABLET | Refills: 2 | Status: SHIPPED | OUTPATIENT
Start: 2022-07-15 | End: 2023-09-07

## 2022-07-15 NOTE — PROGRESS NOTES
"Subjective:     CC: Diagnoses of Inattention, Attention deficit hyperactivity disorder (ADHD), unspecified ADHD type, Anxiety, NGUYEN (obstructive sleep apnea), Lipid screening, and Well adult exam were pertinent to this visit.    HPI: Patient is a 32 y.o. male established patient who presents today discuss the following.       Inattention  New problem.   Recent diagnosis with psycholoigst through testing.  He reports he does have symptoms at home and at work.       NGUYEN (obstructive sleep apnea)  Chronic problem.   Notes improved sleep and improved morning sleepiness with CPAP.       Past Medical History:   Diagnosis Date   • Allergy    • Anxiety    • Apnea, sleep    • Daytime sleepiness    • Gasping for breath    • Insomnia    • Snoring        Social History     Tobacco Use   • Smoking status: Never Smoker   • Smokeless tobacco: Never Used   Vaping Use   • Vaping Use: Some days   • Start date: 3/2/2018   • Substances: Nicotine   • Devices: RefJobzleble tank   Substance Use Topics   • Alcohol use: Yes     Comment: social   • Drug use: Yes     Types: Marijuana       Current Outpatient Medications Ordered in Epic   Medication Sig Dispense Refill   • buPROPion (WELLBUTRIN XL) 150 MG XL tablet Take 1 Tablet by mouth every morning. 30 Tablet 2     No current Epic-ordered facility-administered medications on file.       Allergies:  Shellfish allergy    Health Maintenance: Completed      Objective:       Exam:  /70 (BP Location: Right arm, Patient Position: Sitting, BP Cuff Size: Adult long)   Pulse 68   Temp 36.2 °C (97.2 °F) (Temporal)   Ht 1.702 m (5' 7\")   Wt 72.1 kg (159 lb)   SpO2 99%   BMI 24.90 kg/m²  Body mass index is 24.9 kg/m².      General: Normal appearing. No distress.  HEAD: NCAT  EYES: conjunctiva clear, lids without ptosis, pupils equal  and reactive to light  EARS: ears normal shape and contour, canals are clear bilaterally, TMs clear  Neck: Supple without masses. Thyroid is not enlarged. Normal " ROM  Pulmonary: Clear to ausculation.  Normal effort. No rales, ronchi, or wheezing.  Cardiovascular: Regular rate and rhythm, no murmur. No LE edema  Neurologic: Grossly normal, no focal deficits  Lymph: No cervical or supraclavicular lymph nodes are palpable  Skin: Warm and dry.  No obvious lesions.  Musculoskeletal: Normal gait and station.   Psych: Normal mood and affect. Alert and oriented x3. Judgment and insight is normal.    Assessment & Plan:     32 y.o. male with the following -     1. Inattention  2. Attention deficit hyperactivity disorder (ADHD), unspecified ADHD type  New to discuss. Recently diagnosed with ADHD. Reviewed various options for treatment, including CBT. Patient elects wellbutrin. Rx sent. F/u in 6 weeks.   - buPROPion (WELLBUTRIN XL) 150 MG XL tablet; Take 1 Tablet by mouth every morning.  Dispense: 30 Tablet; Refill: 2  - Comp Metabolic Panel; Future  - TSH WITH REFLEX TO FT4; Future    3. Anxiety  Chronic problem. Doing quite well.   - Comp Metabolic Panel; Future  - TSH WITH REFLEX TO FT4; Future    4. NGUYEN (obstructive sleep apnea)  Chronic problem. Overall improvement.   - CBC WITH DIFFERENTIAL; Future    5. Lipid screening  - Lipid Profile; Future    6. Well adult exam  - Comp Metabolic Panel; Future      Return in about 6 weeks (around 8/26/2022).    Please note that this dictation was created using voice recognition software. I have made every reasonable attempt to correct obvious errors, but I expect that there are errors of grammar and possibly content that I did not discover before finalizing the note.

## 2022-07-15 NOTE — ASSESSMENT & PLAN NOTE
New problem.   Recent diagnosis with psycholoigst through testing.  He reports he does have symptoms at home and at work.

## 2022-09-16 ENCOUNTER — APPOINTMENT (OUTPATIENT)
Dept: MEDICAL GROUP | Facility: MEDICAL CENTER | Age: 32
End: 2022-09-16
Payer: COMMERCIAL

## 2022-11-22 ENCOUNTER — APPOINTMENT (OUTPATIENT)
Dept: URGENT CARE | Facility: CLINIC | Age: 32
End: 2022-11-22
Payer: COMMERCIAL

## 2023-07-30 ENCOUNTER — OFFICE VISIT (OUTPATIENT)
Dept: URGENT CARE | Facility: CLINIC | Age: 33
End: 2023-07-30
Payer: COMMERCIAL

## 2023-07-30 VITALS
RESPIRATION RATE: 16 BRPM | BODY MASS INDEX: 24.96 KG/M2 | TEMPERATURE: 98.6 F | HEIGHT: 67 IN | SYSTOLIC BLOOD PRESSURE: 120 MMHG | HEART RATE: 77 BPM | OXYGEN SATURATION: 98 % | DIASTOLIC BLOOD PRESSURE: 68 MMHG | WEIGHT: 159 LBS

## 2023-07-30 DIAGNOSIS — R09.81 NASAL CONGESTION: ICD-10-CM

## 2023-07-30 DIAGNOSIS — J02.9 SORE THROAT: ICD-10-CM

## 2023-07-30 DIAGNOSIS — R05.1 ACUTE COUGH: ICD-10-CM

## 2023-07-30 LAB
FLUAV RNA SPEC QL NAA+PROBE: NEGATIVE
FLUBV RNA SPEC QL NAA+PROBE: NEGATIVE
RSV RNA SPEC QL NAA+PROBE: NEGATIVE
S PYO DNA SPEC NAA+PROBE: NOT DETECTED
SARS-COV-2 RNA RESP QL NAA+PROBE: NEGATIVE

## 2023-07-30 PROCEDURE — 3074F SYST BP LT 130 MM HG: CPT

## 2023-07-30 PROCEDURE — 0241U POCT CEPHEID COV-2, FLU A/B, RSV - PCR: CPT

## 2023-07-30 PROCEDURE — 87651 STREP A DNA AMP PROBE: CPT

## 2023-07-30 PROCEDURE — 3078F DIAST BP <80 MM HG: CPT

## 2023-07-30 PROCEDURE — 99213 OFFICE O/P EST LOW 20 MIN: CPT

## 2023-07-30 RX ORDER — DEXAMETHASONE SODIUM PHOSPHATE 10 MG/ML
10 INJECTION INTRAMUSCULAR; INTRAVENOUS ONCE
Status: COMPLETED | OUTPATIENT
Start: 2023-07-30 | End: 2023-07-30

## 2023-07-30 RX ADMIN — DEXAMETHASONE SODIUM PHOSPHATE 10 MG: 10 INJECTION INTRAMUSCULAR; INTRAVENOUS at 10:50

## 2023-07-30 NOTE — PROGRESS NOTES
Chief Complaint   Patient presents with    Body Aches     X3days    Pharyngitis    Cough    Fever     102 last night    Headache       HISTORY OF PRESENT ILLNESS: Patient is a pleasant 33 y.o. male who presents to urgent care today ongoing flulike symptoms for the last several days.  Patient has complaints of body aches, sore throat, cough, fever and headache for the last 3 days.  He has been taking over-the-counter medications with little to no relief.  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    Patient Active Problem List    Diagnosis Date Noted    Inattention 07/15/2022    Vertigo 10/18/2021    NGUYEN (obstructive sleep apnea) 09/08/2020    Allergies 06/08/2020    Anxiety 01/31/2020    Allergy to shellfish 01/31/2020       Allergies:Shellfish allergy    Current Outpatient Medications Ordered in Epic   Medication Sig Dispense Refill    buPROPion (WELLBUTRIN XL) 150 MG XL tablet Take 1 Tablet by mouth every morning. (Patient not taking: Reported on 7/30/2023) 30 Tablet 2     No current Epic-ordered facility-administered medications on file.       Past Medical History:   Diagnosis Date    Allergy     Anxiety     Apnea, sleep     Daytime sleepiness     Gasping for breath     Insomnia     Snoring        Social History     Tobacco Use    Smoking status: Never    Smokeless tobacco: Never   Vaping Use    Vaping Use: Some days    Start date: 3/2/2018    Substances: Nicotine    Devices: RefONTRAPORTble tank   Substance Use Topics    Alcohol use: Yes     Comment: social    Drug use: Yes     Types: Marijuana       Family Status   Relation Name Status    Mo  Alive    Fa  Alive    Sis  Alive    Bro  Alive    Sis  Alive     Family History   Problem Relation Age of Onset    Lupus Mother     Depression Mother     Anxiety disorder Mother     Anxiety disorder Father     Hypertension Father     No Known Problems Sister     No Known Problems Brother     No Known Problems Sister        ROS:  Review of  "Systems   Constitutional: Negative for fever, chills, weight loss, malaise, and fatigue.   HENT: Negative for ear pain, nosebleeds, positive congestion, positive sore throat and neck pain.    Eyes: Negative for vision changes.   Neuro: Positive for headache, negative sensory changes, weakness, seizure, LOC.   Cardiovascular: Negative for chest pain, palpitations, orthopnea and leg swelling.   Respiratory: Positive for cough, negative sputum production, shortness of breath and wheezing.   Gastrointestinal: Negative for abdominal pain, nausea, vomiting or diarrhea.   Genitourinary: Negative for dysuria, urgency and frequency.  Musculoskeletal: Negative for falls, neck pain, back pain, joint pain, myalgias.   Skin: Negative for rash, diaphoresis.     Exam:  /68 (BP Location: Left arm, Patient Position: Sitting, BP Cuff Size: Adult)   Pulse 77   Temp 37 °C (98.6 °F) (Temporal)   Resp 16   Ht 1.702 m (5' 7\")   Wt 72.1 kg (159 lb)   SpO2 98%   General: well-nourished, well-developed male in NAD  Head: normocephalic, atraumatic  Eyes: PERRLA, no conjunctival injection, acuity grossly intact, lids normal.  Ears: normal shape and symmetry, no tenderness, no discharge. External canals are without any significant edema or erythema. Tympanic membranes are without any inflammation, no effusion. Gross auditory acuity is intact.  Nose: symmetrical without tenderness, no discharge.  Reddened mucosal nasal beds  Mouth/Throat: reasonable hygiene, positive erythema, exudates and 3+ tonsillar enlargement.  Uvula is midline  Neck: no masses, range of motion within normal limits, no tracheal deviation. No obvious thyroid enlargement.   Lymph: no cervical adenopathy. No supraclavicular adenopathy.   Neuro: alert and oriented. Cranial nerves 1-12 grossly intact. No sensory deficit.   Cardiovascular: regular rate and rhythm. No edema.  Pulmonary: no distress. Chest is symmetrical with respiration, no wheezes, crackles, or " rhonchi.   Abdomen: soft, non-tender, no guarding, no hepatosplenomegaly.  Musculoskeletal: no clubbing, appropriate muscle tone, gait is stable.  Skin: warm, dry, intact, no clubbing, no cyanosis, no rashes.   Psych: appropriate mood, affect, judgement.         Assessment/Plan:  1. Sore throat  POCT CoV-2, Flu A/B, RSV by PCR    POCT GROUP A STREP, PCR    dexamethasone (Decadron) injection (check route below) 10 mg      2. Nasal congestion  POCT CoV-2, Flu A/B, RSV by PCR      3. Acute cough  POCT CoV-2, Flu A/B, RSV by PCR    dexamethasone (Decadron) injection (check route below) 10 mg        This is a 33-year-old male who comes in today with complaints of cough, nasal congestion and sore throat for the last 3 days with a fever and headache.  Patient states he has been taking over-the-counter medications with little to no relief.  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  POCT COVID and strep complete to rule out potential causes.  On physical exam patient has profuse redness along with exudate and 3+ tonsillar enlargement.  Physical exam is otherwise within normal limits.  Patient given Decadron here in the office for comfort measures.  Advised Motrin, Tylenol, vitamin C, D and zinc for at home measures.  Patient is aware of the plan of care and agreeable at this time.  Advised to follow-up if he continues to get worse or does not improve.    Supportive care, differential diagnoses, and indications for immediate follow-up discussed with patient.   Pathogenesis of diagnosis discussed including typical length and natural progression.   Instructed to return to clinic or nearest emergency department for any change in condition, further concerns, or worsening of symptoms.  Patient states understanding of the plan of care and discharge instructions.  Instructed to make an appointment, for follow up, with  primary care provider.      Please note  that this dictation was created using voice recognition software. I have made every reasonable attempt to correct obvious errors, but I expect that there are errors of grammar and possibly content that I did not discover before finalizing the note.      Floridalma LINDER

## 2023-07-31 ENCOUNTER — TELEPHONE (OUTPATIENT)
Dept: URGENT CARE | Facility: CLINIC | Age: 33
End: 2023-07-31
Payer: COMMERCIAL

## 2023-07-31 NOTE — TELEPHONE ENCOUNTER
Patient called stated that antibiotic was to be sent to pharmacy but nothing was sent out.     Pharmacy on filed confirmed please advise

## 2023-07-31 NOTE — TELEPHONE ENCOUNTER
Patient was negative for strep, I did not call in an antibiotic, I did send his swab for culture, at this time I do not think he requires antibiotics.  Referral was placed to ENT as discussed in the office as well as he was given steroids.  If you could please call the patient and let him know.

## 2023-09-07 ENCOUNTER — OFFICE VISIT (OUTPATIENT)
Dept: SLEEP MEDICINE | Facility: MEDICAL CENTER | Age: 33
End: 2023-09-07
Attending: NURSE PRACTITIONER
Payer: COMMERCIAL

## 2023-09-07 VITALS
WEIGHT: 160 LBS | HEART RATE: 60 BPM | BODY MASS INDEX: 25.11 KG/M2 | HEIGHT: 67 IN | RESPIRATION RATE: 16 BRPM | DIASTOLIC BLOOD PRESSURE: 66 MMHG | SYSTOLIC BLOOD PRESSURE: 118 MMHG | OXYGEN SATURATION: 98 %

## 2023-09-07 DIAGNOSIS — Z78.9 NONSMOKER: ICD-10-CM

## 2023-09-07 DIAGNOSIS — G47.33 OSA (OBSTRUCTIVE SLEEP APNEA): ICD-10-CM

## 2023-09-07 PROCEDURE — 99213 OFFICE O/P EST LOW 20 MIN: CPT | Performed by: NURSE PRACTITIONER

## 2023-09-07 PROCEDURE — 99212 OFFICE O/P EST SF 10 MIN: CPT | Performed by: NURSE PRACTITIONER

## 2023-09-07 PROCEDURE — 3078F DIAST BP <80 MM HG: CPT | Performed by: NURSE PRACTITIONER

## 2023-09-07 PROCEDURE — 3074F SYST BP LT 130 MM HG: CPT | Performed by: NURSE PRACTITIONER

## 2023-09-07 ASSESSMENT — PATIENT HEALTH QUESTIONNAIRE - PHQ9: CLINICAL INTERPRETATION OF PHQ2 SCORE: 0

## 2023-09-07 NOTE — PROGRESS NOTES
Chief Complaint   Patient presents with    Follow-Up     Apnea / Last Seen 5/16/2022        HPI:  Baron Smith is a 33 y.o. year old male here today for follow-up on NGUYEN.  Last OV 5/16/22 Manoj LINDER     Currently using APAP @ 5-15cm H20 nightly; RESMED; device obtained 2021.  Compliance report 8/8/2023 through 9/6/2023 indicates 27 nights of use, 25 nights greater than 4 hours use.  Average nightly use 7 hours 7 minutes.  Mean pressure 9.1 cm, minimal mask leak with a reduced AHI of 2.3/h.  Reviewed with patient.  He tolerates mask and pressure well.  Current using full facemask.  He goes to bed normally at midnight and then waking by 9 AM.  2 weeks ago his wife gave birth to twins and his sleep schedule is changed.  He generally goes to bed at 9 PM and wakes every 3 hours for feedings and then gets up for the day between 6 AM and 9 AM.  He denies daytime sleepiness or napping.  He continues to find benefit with therapy.  No significant changes in health over the last year.    He notes officially quitting e-cigarette use.    Sleep hx:  PSG 3/8/2021 noted sleep efficiency of 39%.  Reduced stage III sleep.  Sleep latency was prolonged at 327 minutes.  Overall AHI 6.88/h, REM AHI 20.2/h and supine AHI 9.38/h.  O2 saturations less than 89% for 0.1 minutes of the night.  Mean SPO2 of 94%.  PLMS index 0.      ROS: As per HPI and otherwise negative if not stated.    Past Medical History:   Diagnosis Date    Allergy     Anxiety     Apnea, sleep     Daytime sleepiness     Gasping for breath     Insomnia     Snoring        Past Surgical History:   Procedure Laterality Date    DENTAL EXTRACTION(S)         Family History   Problem Relation Age of Onset    Lupus Mother     Depression Mother     Anxiety disorder Mother     Anxiety disorder Father     Hypertension Father     No Known Problems Sister     No Known Problems Brother     No Known Problems Sister        Social History     Socioeconomic History    Marital  "status: Single     Spouse name: Not on file    Number of children: Not on file    Years of education: Not on file    Highest education level: Not on file   Occupational History    Not on file   Tobacco Use    Smoking status: Never    Smokeless tobacco: Never   Vaping Use    Vaping Use: Some days    Start date: 3/2/2018    Substances: Nicotine    Devices: Refillable tank   Substance and Sexual Activity    Alcohol use: Yes     Comment: social    Drug use: Yes     Types: Marijuana    Sexual activity: Yes     Partners: Female     Comment: engaged   Other Topics Concern    Not on file   Social History Narrative    Not on file     Social Determinants of Health     Financial Resource Strain: Not on file   Food Insecurity: Not on file   Transportation Needs: Not on file   Physical Activity: Not on file   Stress: Not on file   Social Connections: Not on file   Intimate Partner Violence: Not on file   Housing Stability: Not on file       Allergies as of 09/07/2023 - Reviewed 09/07/2023   Allergen Reaction Noted    Shellfish allergy  11/13/2019        Vitals:  /66 (BP Location: Left arm, Patient Position: Sitting, BP Cuff Size: Adult)   Pulse 60   Resp 16   Ht 1.702 m (5' 7\")   Wt 72.6 kg (160 lb)   SpO2 98%     Current medications as of today   No current outpatient medications on file.     No current facility-administered medications for this visit.         Physical Exam:   Gen:           Alert and oriented, No apparent distress. Mood and affect appropriate, normal interaction with examiner.  Eyes:          PERRL, EOM intact, sclere white, conjunctive moist.  Ears:          Not examined.   Hearing:     Grossly intact.  Nose:          Normal, no lesions or deformities.  Dentition:    Not examined.   Oropharynx:   Not examined.   Mallampati Classification: Not examined.   Neck:        Supple, trachea midline, no masses.  Respiratory Effort: No intercostal retractions or use of accessory muscles.   Lung Auscultation: "      Clear to auscultation bilaterally; no rales, rhonchi or wheezing.  CV:            Regular rate and rhythm. No murmurs, rubs or gallops.  Abd:           Not examined.   Lymphadenopathy: Not examined.  Gait and Station: Normal.  Digits and Nails: No clubbing, cyanosis, petechiae, or nodes.   Cranial Nerves: II-XII grossly intact.  Skin:        No rashes, lesions or ulcers noted.               Ext:           No cyanosis or edema.      Assessment:  1. NGUYEN (obstructive sleep apnea)  DME Mask and Supplies      2. BMI 25.0-25.9,adult        3. Nonsmoker            Immunizations:    Flu:recommend  Pneumovax 23:not due  Prevnar 13:not due  PCV 20: not due  COVID-19: recommend    Plan:  NGUYEN is well controlled.  Continue auto CPAP 5 to 15 cm nightly.  Patient would like to switch medical equipment companies.   DME mask/supplies  Follow-up with primary care for other health concerns.  Follow-up in 1 year with compliance report, sooner if needed.    Please note that this dictation was created using voice recognition software. I have made every reasonable attempt to correct obvious errors, but it is possible there are errors of grammar and possibly content that I did not discover before finalizing the note.

## 2023-09-25 ENCOUNTER — APPOINTMENT (OUTPATIENT)
Dept: SLEEP MEDICINE | Facility: MEDICAL CENTER | Age: 33
End: 2023-09-25
Attending: PHYSICIAN ASSISTANT
Payer: COMMERCIAL

## 2023-11-29 ENCOUNTER — OFFICE VISIT (OUTPATIENT)
Dept: URGENT CARE | Facility: CLINIC | Age: 33
End: 2023-11-29
Payer: COMMERCIAL

## 2023-11-29 VITALS
BODY MASS INDEX: 26.37 KG/M2 | DIASTOLIC BLOOD PRESSURE: 86 MMHG | HEART RATE: 88 BPM | WEIGHT: 168 LBS | RESPIRATION RATE: 16 BRPM | HEIGHT: 67 IN | TEMPERATURE: 97.6 F | SYSTOLIC BLOOD PRESSURE: 130 MMHG | OXYGEN SATURATION: 98 %

## 2023-11-29 DIAGNOSIS — J01.00 SUBACUTE MAXILLARY SINUSITIS: ICD-10-CM

## 2023-11-29 PROCEDURE — 99213 OFFICE O/P EST LOW 20 MIN: CPT

## 2023-11-29 PROCEDURE — 3079F DIAST BP 80-89 MM HG: CPT

## 2023-11-29 PROCEDURE — 3075F SYST BP GE 130 - 139MM HG: CPT

## 2023-11-29 RX ORDER — AMOXICILLIN AND CLAVULANATE POTASSIUM 875; 125 MG/1; MG/1
1 TABLET, FILM COATED ORAL 2 TIMES DAILY
Qty: 20 TABLET | Refills: 0 | Status: SHIPPED | OUTPATIENT
Start: 2023-11-29 | End: 2023-12-09

## 2023-11-29 ASSESSMENT — ENCOUNTER SYMPTOMS
SORE THROAT: 1
FEVER: 0
COUGH: 0
SINUS PAIN: 1
CHILLS: 0
SPUTUM PRODUCTION: 0
HEADACHES: 1
SHORTNESS OF BREATH: 0

## 2023-11-29 NOTE — PROGRESS NOTES
CHIEF COMPLAINT  Chief Complaint   Patient presents with    Sinus Problem     Patient states believes to have a sinus infection, sinus pressure x 6 days    Congestion     X 6 days    Pharyngitis     X 6 days     Subjective:   Baron Smith is a 33 y.o. male who  presents to urgent care with complaints of sinus pressure, congestion and sore throat x6 days.  He denies any fevers.  Reports production of green mucus, and intermittent headaches.  Patient also reports history of sinus infections in the past.  He denies any shortness of breath or chest pain.  He denies any nausea, vomiting or diarrhea.    Review of Systems   Constitutional:  Negative for chills and fever.   HENT:  Positive for congestion, sinus pain and sore throat.    Respiratory:  Negative for cough, sputum production and shortness of breath.    Cardiovascular:  Negative for chest pain.   Neurological:  Positive for headaches.       PAST MEDICAL HISTORY  Patient Active Problem List    Diagnosis Date Noted    Inattention 07/15/2022    Vertigo 10/18/2021    NGUYEN (obstructive sleep apnea) 09/08/2020    Allergies 06/08/2020    Anxiety 01/31/2020    Allergy to shellfish 01/31/2020       SURGICAL HISTORY   has a past surgical history that includes dental extraction(s).    ALLERGIES  Allergies   Allergen Reactions    Shellfish Allergy        CURRENT MEDICATIONS  Home Medications       Reviewed by Emmett Nguyễn'jordyn (Medical Assistant) on 11/29/23 at 1311  Med List Status: <None>     Medication Last Dose Status        Patient Rishabh Taking any Medications                           SOCIAL HISTORY  Social History     Tobacco Use    Smoking status: Never    Smokeless tobacco: Never   Vaping Use    Vaping Use: Former    Start date: 3/2/2018    Substances: Nicotine    Devices: Refillable tank   Substance and Sexual Activity    Alcohol use: Yes     Comment: social    Drug use: Yes     Types: Marijuana    Sexual activity: Yes     Partners: Female      "Comment: engaged       FAMILY HISTORY  Family History   Problem Relation Age of Onset    Lupus Mother     Depression Mother     Anxiety disorder Mother     Anxiety disorder Father     Hypertension Father     No Known Problems Sister     No Known Problems Brother     No Known Problems Sister          Medications, Allergies, and current problem list reviewed today in Epic.     Objective:     /86 (BP Location: Left arm, Patient Position: Sitting, BP Cuff Size: Large adult)   Pulse 88   Temp 36.4 °C (97.6 °F)   Resp 16   Ht 1.702 m (5' 7\")   Wt 76.2 kg (168 lb)   SpO2 98%     Physical Exam  Vitals reviewed.   Constitutional:       Appearance: Normal appearance.   HENT:      Head: Normocephalic and atraumatic.      Right Ear: Tympanic membrane normal.      Left Ear: Tympanic membrane normal.      Nose: Congestion and rhinorrhea present.      Mouth/Throat:      Mouth: Mucous membranes are moist.      Pharynx: No oropharyngeal exudate or posterior oropharyngeal erythema.   Cardiovascular:      Rate and Rhythm: Normal rate and regular rhythm.      Pulses: Normal pulses.   Pulmonary:      Effort: Pulmonary effort is normal. No respiratory distress.      Breath sounds: Normal breath sounds. No wheezing or rhonchi.   Lymphadenopathy:      Cervical: No cervical adenopathy.   Skin:     General: Skin is warm.      Capillary Refill: Capillary refill takes less than 2 seconds.   Neurological:      General: No focal deficit present.      Mental Status: He is alert.   Psychiatric:         Mood and Affect: Mood normal.         Behavior: Behavior normal.         Assessment/Plan:     Diagnosis and associated orders:     1. Subacute maxillary sinusitis  amoxicillin-clavulanate (AUGMENTIN) 875-125 MG Tab         Comments/MDM:     Intermittent headaches.  No fever chills or body aches.   Eating and drinking without vomiting or diarrhea.  Pertinent past history of sinusitis.  Vitals normal and PO2 adequate.  Nasal congestion, " rhinorrhea with pharynx erythema.  Purulent discharge appreciated in the posterior oropharynx.   TMs clear bilaterally without erythema or bulging.  Lungs are clear without wheezing rhonchi or rales.  Vital signs are stable in clinic, he is afebrile.  Discussed with patient appropriate course of sinusitis.  Counseled on use of OTC medications and measures to alleviate symptoms.  Patient counseled on continuing conservative measures prior to use of antibiotics.  Patient was given a contingent antibiotic prescription to fill and use as directed if symptoms progressed as discussed and agreed upon.   Instructed to return to ER or urgent care if symptoms worsen or fail to improve.             Differential diagnosis, natural history, supportive care, and indications for immediate follow-up discussed.    Advised the patient to follow-up with the primary care physician for recheck, reevaluation, and consideration of further management.    Please note that this dictation was created using voice recognition software. I have made a reasonable attempt to correct obvious errors, but I expect that there are errors of grammar and possibly content that I did not discover before finalizing the note.    This note was electronically signed by MODESTO Bennett

## 2024-04-16 ENCOUNTER — OFFICE VISIT (OUTPATIENT)
Dept: SLEEP MEDICINE | Facility: MEDICAL CENTER | Age: 34
End: 2024-04-16
Attending: PHYSICIAN ASSISTANT
Payer: COMMERCIAL

## 2024-04-16 VITALS
OXYGEN SATURATION: 96 % | BODY MASS INDEX: 26.37 KG/M2 | HEART RATE: 82 BPM | HEIGHT: 67 IN | SYSTOLIC BLOOD PRESSURE: 122 MMHG | DIASTOLIC BLOOD PRESSURE: 74 MMHG | WEIGHT: 168 LBS | RESPIRATION RATE: 16 BRPM

## 2024-04-16 ASSESSMENT — PATIENT HEALTH QUESTIONNAIRE - PHQ9: CLINICAL INTERPRETATION OF PHQ2 SCORE: 0

## 2024-05-31 ENCOUNTER — OFFICE VISIT (OUTPATIENT)
Dept: SLEEP MEDICINE | Facility: MEDICAL CENTER | Age: 34
End: 2024-05-31
Attending: PHYSICIAN ASSISTANT
Payer: COMMERCIAL

## 2024-05-31 VITALS
RESPIRATION RATE: 16 BRPM | BODY MASS INDEX: 25.9 KG/M2 | OXYGEN SATURATION: 97 % | HEIGHT: 67 IN | WEIGHT: 165 LBS | DIASTOLIC BLOOD PRESSURE: 84 MMHG | HEART RATE: 83 BPM | SYSTOLIC BLOOD PRESSURE: 122 MMHG

## 2024-05-31 DIAGNOSIS — K21.9 GASTROESOPHAGEAL REFLUX DISEASE WITHOUT ESOPHAGITIS: ICD-10-CM

## 2024-05-31 DIAGNOSIS — G47.33 OSA (OBSTRUCTIVE SLEEP APNEA): ICD-10-CM

## 2024-05-31 PROCEDURE — 3079F DIAST BP 80-89 MM HG: CPT | Performed by: PHYSICIAN ASSISTANT

## 2024-05-31 PROCEDURE — 99213 OFFICE O/P EST LOW 20 MIN: CPT | Performed by: PHYSICIAN ASSISTANT

## 2024-05-31 PROCEDURE — 3074F SYST BP LT 130 MM HG: CPT | Performed by: PHYSICIAN ASSISTANT

## 2024-05-31 ASSESSMENT — ENCOUNTER SYMPTOMS
DIZZINESS: 0
PALPITATIONS: 0
SORE THROAT: 0
SPUTUM PRODUCTION: 0
WEIGHT LOSS: 0
CHILLS: 0
TREMORS: 0
FEVER: 0
HEADACHES: 0
WHEEZING: 0
COUGH: 0
ORTHOPNEA: 0
SINUS PAIN: 0
INSOMNIA: 0
SHORTNESS OF BREATH: 0
HEARTBURN: 1

## 2024-05-31 NOTE — PROGRESS NOTES
"Chief Complaint   Patient presents with    Apnea     Last Office Visit 9/7/23 with MODESTO Silva    1st Compliance: yes    Settings:  APAP @ 5-15cm H20       HPI:  Baron Smith is a 34 y.o. year old male here today for follow-up on {diagnosis:18756}.    Past Medical History: ***    Vitals:  /84 (BP Location: Left arm, Patient Position: Sitting, BP Cuff Size: Adult)   Pulse 83   Resp 16   Ht 1.702 m (5' 7\")   Wt 74.8 kg (165 lb)   SpO2 97%     Recent Imaging: ***    Currently using  {brand:03930} {modes:90390} @ ***cm H20 pressure; compliance reviewed for ***, days used ***, average daily usage ***, ***% of days greater than or equal to 4 hours, mask leak at  *** LPM at 95th percentile, AHI *** per hour.    Device obtained ***   DME provider {DME:90341}  Mask interface ***   Polysomnogram ***       Sleep schedule {schedule:18481}  Symptoms {symptoms:40328}    [place] Sleepiness Scale No data recorded   Stop Bang Score No data recorded         Review of Systems   Constitutional:  Positive for malaise/fatigue (known cause twin boys improving). Negative for chills, fever and weight loss.   HENT:  Negative for congestion, hearing loss, nosebleeds, sinus pain, sore throat and tinnitus.    Eyes:         Presc glasses    Respiratory:  Negative for cough, sputum production, shortness of breath and wheezing.    Cardiovascular:  Negative for chest pain, palpitations, orthopnea and leg swelling.   Gastrointestinal:  Positive for heartburn (tums, severe).        No dentures, no missing teeth or difficulty, no swallowing issues   Neurological:  Negative for dizziness, tremors and headaches.   Psychiatric/Behavioral:  The patient does not have insomnia.        Past Medical History:   Diagnosis Date    Allergy     Anxiety     Apnea, sleep     Daytime sleepiness     Gasping for breath     Insomnia     Snoring        Past Surgical History:   Procedure Laterality Date    DENTAL EXTRACTION(S)   "       Family History   Problem Relation Age of Onset    Lupus Mother     Depression Mother     Anxiety disorder Mother     Anxiety disorder Father     Hypertension Father     No Known Problems Sister     No Known Problems Brother     No Known Problems Sister        Social History     Socioeconomic History    Marital status: Single     Spouse name: Not on file    Number of children: Not on file    Years of education: Not on file    Highest education level: Not on file   Occupational History    Not on file   Tobacco Use    Smoking status: Never    Smokeless tobacco: Never   Vaping Use    Vaping status: Former    Start date: 3/2/2018    Substances: Nicotine    Devices: Refillable tank   Substance and Sexual Activity    Alcohol use: Yes     Comment: social    Drug use: Yes     Types: Marijuana    Sexual activity: Yes     Partners: Female     Comment: engaged   Other Topics Concern    Not on file   Social History Narrative    Not on file     Social Determinants of Health     Financial Resource Strain: Not on file   Food Insecurity: Not on file   Transportation Needs: Not on file   Physical Activity: Not on file   Stress: Not on file   Social Connections: Not on file   Intimate Partner Violence: Not on file   Housing Stability: Not on file       Allergies as of 05/31/2024 - Reviewed 05/31/2024   Allergen Reaction Noted    Shellfish allergy  11/13/2019          Current medications as of today   No current outpatient medications on file.     No current facility-administered medications for this visit.         Physical Exam:   Gen:           Alert and oriented, No apparent distress. Mood and affect appropriate, normal interaction with examiner.   Hearing:     Grossly intact.  Nose:          Normal, no lesions or deformities.  Dentition:    Good dentition.   Oropharynx:   Tongue normal, posterior pharynx without erythema or exudate.  Mallampati Classification: ***  Neck:        Supple, trachea midline, no masses.  Respiratory  Effort: No intercostal retractions or use of accessory muscles.   Gait and Station: Normal.  Digits and Nails: No clubbing, cyanosis, petechiae, or nodes.   Skin:        No rashes, lesions or ulcers noted.               Ext:           No cyanosis or edema.      Immunizations:  Flu:***  Pneumovax 23:***  Prevnar 13:***  PCV 20: ***  SARS CoV2 Vaccine: ***, ***    Assessment / Plan:  There are no diagnoses linked to this encounter.    {Reviewed compliance:80066}    Follow-up:   No follow-ups on file.    Please note that this dictation was created using voice recognition software. I have made every reasonable attempt to correct obvious errors, but it is possible there are errors of grammar and possibly content that I did not discover before finalizing the note.

## 2024-05-31 NOTE — PATIENT INSTRUCTIONS
1-Nonpharmaceutical interventions for reflux reviewed including:   -Avoiding known triggers which may include tomato products, red sauce, citrus, spicy foods, chocolate, alcohol, carbonated beverages, tobacco or vape use   -Nothing to eat or drink except for sips of water for 2 to 3 hours before bedtime   -Sleep with head of bed elevated 20 degrees   -Sleep on back or left side  2-reviewed compliance which is excellent  3-send updated order for mask and supplies  4-As a reminder use distilled water only in humidifier chamber.    5-Today we reviewed equipment cleaning  once weekly minimum  mask, tubing and water chamber  use dedicated container  use mild soap and water  SoClean or other ozone  are not recommended  white vinegar and water solution is no longer recommended  hang tubing to dry  mask sanitizing wipes are an option for use   6-check mask fitting may be excessively tight   7-follow up in one year

## 2025-04-28 ENCOUNTER — OFFICE VISIT (OUTPATIENT)
Dept: URGENT CARE | Facility: CLINIC | Age: 35
End: 2025-04-28
Payer: COMMERCIAL

## 2025-04-28 VITALS
HEART RATE: 85 BPM | DIASTOLIC BLOOD PRESSURE: 78 MMHG | WEIGHT: 165 LBS | RESPIRATION RATE: 8 BRPM | SYSTOLIC BLOOD PRESSURE: 130 MMHG | TEMPERATURE: 97.9 F | BODY MASS INDEX: 25.01 KG/M2 | HEIGHT: 68 IN | OXYGEN SATURATION: 99 %

## 2025-04-28 DIAGNOSIS — B96.89 BACTERIAL SINUSITIS: ICD-10-CM

## 2025-04-28 DIAGNOSIS — J32.9 BACTERIAL SINUSITIS: ICD-10-CM

## 2025-04-28 RX ORDER — LORATADINE 10 MG/1
10 TABLET ORAL DAILY
COMMUNITY

## 2025-04-28 RX ORDER — FLUTICASONE PROPIONATE 50 MCG
1 SPRAY, SUSPENSION (ML) NASAL DAILY
COMMUNITY

## 2025-04-28 NOTE — PROGRESS NOTES
"  Subjective:      35 y.o. male presents to urgent care for cold symptoms that started 10 days ago. He is experiencing headache, increased sinus pressure, and ear pain. No tobacco product use. No history of asthma or COPD. He is vaccinated against COVID. No known sick contacts.    He denies any other questions or concerns at this time.    Current problem list, medication, and past medical/surgical history were reviewed in Epic.    ROS  See HPI     Objective:      /78 (BP Location: Left arm, Patient Position: Sitting, BP Cuff Size: Adult)   Pulse 85   Temp 36.6 °C (97.9 °F) (Oral)   Resp (!) 8   Ht 1.727 m (5' 8\")   Wt 74.8 kg (165 lb)   SpO2 99%   BMI 25.09 kg/m²     Physical Exam  Constitutional:       General: He is not in acute distress.     Appearance: He is not diaphoretic.   HENT:      Right Ear: Tympanic membrane, ear canal and external ear normal.      Left Ear: Tympanic membrane, ear canal and external ear normal.      Nose:      Right Sinus: Maxillary sinus tenderness present. No frontal sinus tenderness.      Left Sinus: Maxillary sinus tenderness present. No frontal sinus tenderness.      Mouth/Throat:      Tongue: Tongue does not deviate from midline.      Palate: No lesions.      Pharynx: Uvula midline. No posterior oropharyngeal erythema.   Cardiovascular:      Rate and Rhythm: Normal rate and regular rhythm.      Heart sounds: Normal heart sounds.   Pulmonary:      Effort: Pulmonary effort is normal. No respiratory distress.      Breath sounds: Normal breath sounds.   Neurological:      Mental Status: He is alert.   Psychiatric:         Mood and Affect: Affect normal.         Judgment: Judgment normal.       Assessment/Plan:     1. Bacterial sinusitis  Criteria for bacterial sinusitis has been met.  Prescription for Augmentin has been sent.  Tylenol, ibuprofen, Flonase, and a nondrowsy antihistamine as needed for symptomatic relief.  - amoxicillin-clavulanate (AUGMENTIN) 875-125 MG Tab; " Take 1 Tablet by mouth 2 times a day for 5 days.  Dispense: 10 Tablet; Refill: 0      Instructed to return to Urgent Care or nearest Emergency Department if symptoms fail to improve, for any change in condition, further concerns, or new concerning symptoms. Patient states understanding of the plan of care and discharge instructions.    Cinthia Vasquez M.D.

## 2025-05-04 SDOH — ECONOMIC STABILITY: TRANSPORTATION INSECURITY
IN THE PAST 12 MONTHS, HAS LACK OF RELIABLE TRANSPORTATION KEPT YOU FROM MEDICAL APPOINTMENTS, MEETINGS, WORK OR FROM GETTING THINGS NEEDED FOR DAILY LIVING?: NO

## 2025-05-04 SDOH — HEALTH STABILITY: PHYSICAL HEALTH: ON AVERAGE, HOW MANY MINUTES DO YOU ENGAGE IN EXERCISE AT THIS LEVEL?: 50 MIN

## 2025-05-04 SDOH — ECONOMIC STABILITY: TRANSPORTATION INSECURITY
IN THE PAST 12 MONTHS, HAS LACK OF TRANSPORTATION KEPT YOU FROM MEETINGS, WORK, OR FROM GETTING THINGS NEEDED FOR DAILY LIVING?: NO

## 2025-05-04 SDOH — ECONOMIC STABILITY: TRANSPORTATION INSECURITY
IN THE PAST 12 MONTHS, HAS THE LACK OF TRANSPORTATION KEPT YOU FROM MEDICAL APPOINTMENTS OR FROM GETTING MEDICATIONS?: NO

## 2025-05-04 SDOH — ECONOMIC STABILITY: FOOD INSECURITY: WITHIN THE PAST 12 MONTHS, YOU WORRIED THAT YOUR FOOD WOULD RUN OUT BEFORE YOU GOT MONEY TO BUY MORE.: NEVER TRUE

## 2025-05-04 SDOH — ECONOMIC STABILITY: INCOME INSECURITY: HOW HARD IS IT FOR YOU TO PAY FOR THE VERY BASICS LIKE FOOD, HOUSING, MEDICAL CARE, AND HEATING?: SOMEWHAT HARD

## 2025-05-04 SDOH — HEALTH STABILITY: PHYSICAL HEALTH: ON AVERAGE, HOW MANY DAYS PER WEEK DO YOU ENGAGE IN MODERATE TO STRENUOUS EXERCISE (LIKE A BRISK WALK)?: 2 DAYS

## 2025-05-04 SDOH — ECONOMIC STABILITY: INCOME INSECURITY: IN THE LAST 12 MONTHS, WAS THERE A TIME WHEN YOU WERE NOT ABLE TO PAY THE MORTGAGE OR RENT ON TIME?: NO

## 2025-05-04 SDOH — ECONOMIC STABILITY: FOOD INSECURITY: WITHIN THE PAST 12 MONTHS, THE FOOD YOU BOUGHT JUST DIDN'T LAST AND YOU DIDN'T HAVE MONEY TO GET MORE.: NEVER TRUE

## 2025-05-04 SDOH — ECONOMIC STABILITY: HOUSING INSECURITY
IN THE LAST 12 MONTHS, WAS THERE A TIME WHEN YOU DID NOT HAVE A STEADY PLACE TO SLEEP OR SLEPT IN A SHELTER (INCLUDING NOW)?: NO

## 2025-05-04 SDOH — HEALTH STABILITY: MENTAL HEALTH
STRESS IS WHEN SOMEONE FEELS TENSE, NERVOUS, ANXIOUS, OR CAN'T SLEEP AT NIGHT BECAUSE THEIR MIND IS TROUBLED. HOW STRESSED ARE YOU?: ONLY A LITTLE

## 2025-05-04 ASSESSMENT — SOCIAL DETERMINANTS OF HEALTH (SDOH)
HOW OFTEN DO YOU ATTEND CHURCH OR RELIGIOUS SERVICES?: NEVER
IN A TYPICAL WEEK, HOW MANY TIMES DO YOU TALK ON THE PHONE WITH FAMILY, FRIENDS, OR NEIGHBORS?: MORE THAN THREE TIMES A WEEK
HOW OFTEN DO YOU ATTENT MEETINGS OF THE CLUB OR ORGANIZATION YOU BELONG TO?: PATIENT DECLINED
HOW OFTEN DO YOU HAVE A DRINK CONTAINING ALCOHOL: 2-4 TIMES A MONTH
HOW OFTEN DO YOU GET TOGETHER WITH FRIENDS OR RELATIVES?: ONCE A WEEK
WITHIN THE PAST 12 MONTHS, YOU WORRIED THAT YOUR FOOD WOULD RUN OUT BEFORE YOU GOT THE MONEY TO BUY MORE: NEVER TRUE
HOW OFTEN DO YOU HAVE SIX OR MORE DRINKS ON ONE OCCASION: LESS THAN MONTHLY
HOW OFTEN DO YOU ATTENT MEETINGS OF THE CLUB OR ORGANIZATION YOU BELONG TO?: PATIENT DECLINED
IN THE PAST 12 MONTHS, HAS THE ELECTRIC, GAS, OIL, OR WATER COMPANY THREATENED TO SHUT OFF SERVICE IN YOUR HOME?: NO
HOW OFTEN DO YOU ATTEND CHURCH OR RELIGIOUS SERVICES?: NEVER
HOW MANY DRINKS CONTAINING ALCOHOL DO YOU HAVE ON A TYPICAL DAY WHEN YOU ARE DRINKING: 3 OR 4
HOW OFTEN DO YOU GET TOGETHER WITH FRIENDS OR RELATIVES?: ONCE A WEEK
IN A TYPICAL WEEK, HOW MANY TIMES DO YOU TALK ON THE PHONE WITH FAMILY, FRIENDS, OR NEIGHBORS?: MORE THAN THREE TIMES A WEEK
HOW HARD IS IT FOR YOU TO PAY FOR THE VERY BASICS LIKE FOOD, HOUSING, MEDICAL CARE, AND HEATING?: SOMEWHAT HARD
DO YOU BELONG TO ANY CLUBS OR ORGANIZATIONS SUCH AS CHURCH GROUPS UNIONS, FRATERNAL OR ATHLETIC GROUPS, OR SCHOOL GROUPS?: NO
DO YOU BELONG TO ANY CLUBS OR ORGANIZATIONS SUCH AS CHURCH GROUPS UNIONS, FRATERNAL OR ATHLETIC GROUPS, OR SCHOOL GROUPS?: NO

## 2025-05-04 ASSESSMENT — LIFESTYLE VARIABLES
HOW OFTEN DO YOU HAVE SIX OR MORE DRINKS ON ONE OCCASION: LESS THAN MONTHLY
AUDIT-C TOTAL SCORE: 4
HOW OFTEN DO YOU HAVE A DRINK CONTAINING ALCOHOL: 2-4 TIMES A MONTH
HOW MANY STANDARD DRINKS CONTAINING ALCOHOL DO YOU HAVE ON A TYPICAL DAY: 3 OR 4
SKIP TO QUESTIONS 9-10: 0

## 2025-05-06 ENCOUNTER — OFFICE VISIT (OUTPATIENT)
Dept: MEDICAL GROUP | Age: 35
End: 2025-05-06
Payer: COMMERCIAL

## 2025-05-06 VITALS
SYSTOLIC BLOOD PRESSURE: 114 MMHG | HEART RATE: 86 BPM | WEIGHT: 161.6 LBS | OXYGEN SATURATION: 99 % | HEIGHT: 68 IN | DIASTOLIC BLOOD PRESSURE: 70 MMHG | BODY MASS INDEX: 24.49 KG/M2 | TEMPERATURE: 98.2 F

## 2025-05-06 DIAGNOSIS — Z13.220 LIPID SCREENING: ICD-10-CM

## 2025-05-06 DIAGNOSIS — G47.33 OSA (OBSTRUCTIVE SLEEP APNEA): Chronic | ICD-10-CM

## 2025-05-06 DIAGNOSIS — R79.89 LOW VITAMIN D LEVEL: ICD-10-CM

## 2025-05-06 DIAGNOSIS — Z13.0 SCREENING FOR DEFICIENCY ANEMIA: ICD-10-CM

## 2025-05-06 DIAGNOSIS — F41.9 ANXIETY: ICD-10-CM

## 2025-05-06 DIAGNOSIS — R42 VERTIGO: ICD-10-CM

## 2025-05-06 DIAGNOSIS — Z11.59 NEED FOR HEPATITIS C SCREENING TEST: ICD-10-CM

## 2025-05-06 DIAGNOSIS — T78.40XA ALLERGY, INITIAL ENCOUNTER: ICD-10-CM

## 2025-05-06 DIAGNOSIS — F90.0 ATTENTION DEFICIT HYPERACTIVITY DISORDER (ADHD), PREDOMINANTLY INATTENTIVE TYPE: ICD-10-CM

## 2025-05-06 DIAGNOSIS — Z11.4 SCREENING FOR HIV (HUMAN IMMUNODEFICIENCY VIRUS): ICD-10-CM

## 2025-05-06 DIAGNOSIS — Z13.1 DIABETES MELLITUS SCREENING: ICD-10-CM

## 2025-05-06 PROBLEM — R41.840 INATTENTION: Status: RESOLVED | Noted: 2022-07-15 | Resolved: 2025-05-06

## 2025-05-06 PROBLEM — Z91.013 ALLERGY TO SHELLFISH: Status: RESOLVED | Noted: 2020-01-31 | Resolved: 2025-05-06

## 2025-05-06 PROCEDURE — 3078F DIAST BP <80 MM HG: CPT | Performed by: STUDENT IN AN ORGANIZED HEALTH CARE EDUCATION/TRAINING PROGRAM

## 2025-05-06 PROCEDURE — 3074F SYST BP LT 130 MM HG: CPT | Performed by: STUDENT IN AN ORGANIZED HEALTH CARE EDUCATION/TRAINING PROGRAM

## 2025-05-06 PROCEDURE — 99214 OFFICE O/P EST MOD 30 MIN: CPT | Performed by: STUDENT IN AN ORGANIZED HEALTH CARE EDUCATION/TRAINING PROGRAM

## 2025-05-06 RX ORDER — LEVOCETIRIZINE DIHYDROCHLORIDE 5 MG/1
5 TABLET, FILM COATED ORAL DAILY
COMMUNITY

## 2025-05-06 ASSESSMENT — PATIENT HEALTH QUESTIONNAIRE - PHQ9: CLINICAL INTERPRETATION OF PHQ2 SCORE: 0

## 2025-05-06 NOTE — PROGRESS NOTES
Verbal consent was acquired by the patient to use [employer] ambient listening note generation during this visit     Subjective:     HPI:   History of Present Illness  The patient is a 35-year-old male who presents to establish care.    He has been experiencing dizziness for the past 20 days, which was initially attributed to allergies by a [facility] facility last week. The dizziness is described as a wavy sensation, accompanied by pressure in his head, nose, and ear. He reports no rhinorrhea or recent upper respiratory infection but mentions mild congestion. He also reports no numbness or weakness in any part of his body. He has been using Flonase as part of his treatment regimen. He recalls a previous episode of motion-induced dizziness but notes that this is the first time he has experienced such symptoms for an extended period of 20 days.    He was diagnosed with sleep apnea 2 years ago, which was classified as moderate. He has been using a CPAP machine for the past 2 years, which has resulted in some improvement in his daytime sleepiness. However, he continues to experience fatigue. He has not consulted an ENT specialist and is uncertain whether his sleep apnea is central or obstructive. He has previously tried a dental device for his condition, which proved ineffective.    He has a history of anxiety, which has been well-managed until the onset of his current dizziness. He also has ADHD, which he manages without medication. He has previously tried medication for his ADHD but found it exacerbated his symptoms.    He has no history of major surgeries, except for dental procedures. He had chickenpox as a child. He is not a vegan or vegetarian. He is fasting today.    SOCIAL HISTORY  He quit vaping nicotine 3 weeks ago. He works as a mental health therapist.    FAMILY HISTORY  His brother and father have high blood pressure. His mother has lupus. No family history of cancer or diabetes.    Health Maintenance:  "Completed  No n/v/d/c, fever, chills, sob, chest pain. Dizziness+      Objective:     Exam:  /70 (BP Location: Left arm, Patient Position: Sitting, BP Cuff Size: Adult)   Pulse 86   Temp 36.8 °C (98.2 °F) (Temporal)   Ht 1.737 m (5' 8.39\")   Wt 73.3 kg (161 lb 9.6 oz)   SpO2 99%   BMI 24.29 kg/m²  Body mass index is 24.29 kg/m².    Physical Exam  Gen: nad  HENT: ncat, EOMI. Normal TM bl with possible clear fluid behind TM L>R  Resp: ctabl  Cardiac: rrr, no m  GI: nt/nd  Neuro: no focal deficits, cn 2-12 intact throughout, sensation to light touch intact throughout. No nystagmus  Psych: appropriate mood and affect      Results      Assessment & Plan:     1. Screening for HIV (human immunodeficiency virus)  HIV AG/AB COMBO ASSAY SCREENING      2. Need for hepatitis C screening test  HEP C VIRUS ANTIBODY      3. NGUYEN (obstructive sleep apnea)  Comp Metabolic Panel      4. Allergy, initial encounter        5. Attention deficit hyperactivity disorder (ADHD), predominantly inattentive type        6. Anxiety  Comp Metabolic Panel    TSH WITH REFLEX TO FT4      7. Vertigo  CBC WITHOUT DIFFERENTIAL    Comp Metabolic Panel      8. Low vitamin D level  VITAMIN D,25 HYDROXY (DEFICIENCY)      9. Lipid screening  Lipid Profile      10. Diabetes mellitus screening  HEMOGLOBIN A1C      11. Screening for deficiency anemia  CBC WITHOUT DIFFERENTIAL          Assessment & Plan  1. Dizziness.  - The dizziness is likely due to allergies, causing sinus pressure and eustachian tube blockage, leading to a sensation of fullness and dizziness.  - There are no signs of infection.  - He is advised to continue using Flonase nasal spray, ensuring proper technique by aiming towards the corner of the ear.  - Over-the-counter Zyrtec should be taken once daily in the evening. If symptoms persist, a referral to an ENT specialist will be considered for further evaluation of potential nasal polyps.    2. Sleep Apnea.  - He has been using a " CPAP machine for about 2 years, which has improved his daytime sleepiness.  - Physical exam findings are consistent with the use of CPAP without complications.  - He should continue using the CPAP machine and follow up with a pulmonologist annually to re-evaluate the CPAP prescription.  - No new treatments are recommended at this time.    3. Anxiety.  - He experiences anxiety intermittently, which has worsened with the recent dizziness.  - He manages his anxiety without medication and feels in control of it.  - No new medications are recommended at this time.  - Continued monitoring of anxiety symptoms is advised.    4. ADHD  -well controlled wo meds    5. Health Maintenance.  - HIV and Hepatitis C screenings will be conducted as part of routine health maintenance.  - Comprehensive blood work, including CBC, CMP, A1c, and cholesterol levels, will be ordered.  - He is advised to take over-the-counter vitamin D supplements, with a dosage of 2000 to 5000 IUs daily.  - Follow-up for lab results in 4 weeks.        Return in about 4 weeks (around 6/3/2025) for lab results.    Please note that this dictation was created using voice recognition software. I have made every reasonable attempt to correct obvious errors, but I expect that there are errors of grammar and possibly content that I did not discover before finalizing the note.

## 2025-05-19 ENCOUNTER — HOSPITAL ENCOUNTER (OUTPATIENT)
Dept: LAB | Facility: MEDICAL CENTER | Age: 35
End: 2025-05-19
Attending: STUDENT IN AN ORGANIZED HEALTH CARE EDUCATION/TRAINING PROGRAM
Payer: COMMERCIAL

## 2025-05-19 DIAGNOSIS — Z11.4 SCREENING FOR HIV (HUMAN IMMUNODEFICIENCY VIRUS): ICD-10-CM

## 2025-05-19 DIAGNOSIS — R79.89 LOW VITAMIN D LEVEL: ICD-10-CM

## 2025-05-19 DIAGNOSIS — G47.33 OSA (OBSTRUCTIVE SLEEP APNEA): Chronic | ICD-10-CM

## 2025-05-19 DIAGNOSIS — Z13.1 DIABETES MELLITUS SCREENING: ICD-10-CM

## 2025-05-19 DIAGNOSIS — Z13.0 SCREENING FOR DEFICIENCY ANEMIA: ICD-10-CM

## 2025-05-19 DIAGNOSIS — R42 VERTIGO: ICD-10-CM

## 2025-05-19 DIAGNOSIS — F41.9 ANXIETY: ICD-10-CM

## 2025-05-19 DIAGNOSIS — Z11.59 NEED FOR HEPATITIS C SCREENING TEST: ICD-10-CM

## 2025-05-19 DIAGNOSIS — Z13.220 LIPID SCREENING: ICD-10-CM

## 2025-05-19 LAB
ERYTHROCYTE [DISTWIDTH] IN BLOOD BY AUTOMATED COUNT: 38.7 FL (ref 35.9–50)
EST. AVERAGE GLUCOSE BLD GHB EST-MCNC: 97 MG/DL
HBA1C MFR BLD: 5 % (ref 4–5.6)
HCT VFR BLD AUTO: 46.4 % (ref 42–52)
HCV AB SER QL: NORMAL
HGB BLD-MCNC: 15.6 G/DL (ref 14–18)
HIV 1+2 AB+HIV1 P24 AG SERPL QL IA: NORMAL
MCH RBC QN AUTO: 30.6 PG (ref 27–33)
MCHC RBC AUTO-ENTMCNC: 33.6 G/DL (ref 32.3–36.5)
MCV RBC AUTO: 91.2 FL (ref 81.4–97.8)
PLATELET # BLD AUTO: 319 K/UL (ref 164–446)
PMV BLD AUTO: 9.9 FL (ref 9–12.9)
RBC # BLD AUTO: 5.09 M/UL (ref 4.7–6.1)
WBC # BLD AUTO: 5.5 K/UL (ref 4.8–10.8)

## 2025-05-19 PROCEDURE — 80053 COMPREHEN METABOLIC PANEL: CPT

## 2025-05-19 PROCEDURE — 82306 VITAMIN D 25 HYDROXY: CPT

## 2025-05-19 PROCEDURE — 36415 COLL VENOUS BLD VENIPUNCTURE: CPT

## 2025-05-19 PROCEDURE — 85027 COMPLETE CBC AUTOMATED: CPT

## 2025-05-19 PROCEDURE — 80061 LIPID PANEL: CPT

## 2025-05-19 PROCEDURE — 84443 ASSAY THYROID STIM HORMONE: CPT

## 2025-05-19 PROCEDURE — 87389 HIV-1 AG W/HIV-1&-2 AB AG IA: CPT

## 2025-05-19 PROCEDURE — 86803 HEPATITIS C AB TEST: CPT

## 2025-05-19 PROCEDURE — 83036 HEMOGLOBIN GLYCOSYLATED A1C: CPT

## 2025-05-20 ENCOUNTER — RESULTS FOLLOW-UP (OUTPATIENT)
Dept: MEDICAL GROUP | Age: 35
End: 2025-05-20

## 2025-05-20 LAB
25(OH)D3 SERPL-MCNC: 31 NG/ML (ref 30–100)
ALBUMIN SERPL BCP-MCNC: 4.8 G/DL (ref 3.2–4.9)
ALBUMIN/GLOB SERPL: 1.7 G/DL
ALP SERPL-CCNC: 55 U/L (ref 30–99)
ALT SERPL-CCNC: 15 U/L (ref 2–50)
ANION GAP SERPL CALC-SCNC: 12 MMOL/L (ref 7–16)
AST SERPL-CCNC: 20 U/L (ref 12–45)
BILIRUB SERPL-MCNC: 0.7 MG/DL (ref 0.1–1.5)
BUN SERPL-MCNC: 17 MG/DL (ref 8–22)
CALCIUM ALBUM COR SERPL-MCNC: 9 MG/DL (ref 8.5–10.5)
CALCIUM SERPL-MCNC: 9.6 MG/DL (ref 8.5–10.5)
CHLORIDE SERPL-SCNC: 103 MMOL/L (ref 96–112)
CHOLEST SERPL-MCNC: 151 MG/DL (ref 100–199)
CO2 SERPL-SCNC: 23 MMOL/L (ref 20–33)
CREAT SERPL-MCNC: 1.04 MG/DL (ref 0.5–1.4)
FASTING STATUS PATIENT QL REPORTED: NORMAL
GFR SERPLBLD CREATININE-BSD FMLA CKD-EPI: 97 ML/MIN/1.73 M 2
GLOBULIN SER CALC-MCNC: 2.8 G/DL (ref 1.9–3.5)
GLUCOSE SERPL-MCNC: 81 MG/DL (ref 65–99)
HDLC SERPL-MCNC: 58 MG/DL
LDLC SERPL CALC-MCNC: 86 MG/DL
POTASSIUM SERPL-SCNC: 4.2 MMOL/L (ref 3.6–5.5)
PROT SERPL-MCNC: 7.6 G/DL (ref 6–8.2)
SODIUM SERPL-SCNC: 138 MMOL/L (ref 135–145)
TRIGL SERPL-MCNC: 36 MG/DL (ref 0–149)
TSH SERPL DL<=0.005 MIU/L-ACNC: 0.89 UIU/ML (ref 0.38–5.33)

## 2025-06-04 ENCOUNTER — APPOINTMENT (OUTPATIENT)
Dept: MEDICAL GROUP | Age: 35
End: 2025-06-04
Payer: COMMERCIAL

## 2025-06-04 VITALS
HEIGHT: 68 IN | HEART RATE: 73 BPM | TEMPERATURE: 98.2 F | WEIGHT: 160 LBS | OXYGEN SATURATION: 99 % | DIASTOLIC BLOOD PRESSURE: 68 MMHG | BODY MASS INDEX: 24.25 KG/M2 | SYSTOLIC BLOOD PRESSURE: 110 MMHG

## 2025-06-04 DIAGNOSIS — T78.40XA ALLERGY, INITIAL ENCOUNTER: Primary | ICD-10-CM

## 2025-06-04 DIAGNOSIS — G47.33 OSA (OBSTRUCTIVE SLEEP APNEA): Chronic | ICD-10-CM

## 2025-06-04 DIAGNOSIS — R42 VERTIGO: ICD-10-CM

## 2025-06-04 PROCEDURE — 3074F SYST BP LT 130 MM HG: CPT | Performed by: STUDENT IN AN ORGANIZED HEALTH CARE EDUCATION/TRAINING PROGRAM

## 2025-06-04 PROCEDURE — 3078F DIAST BP <80 MM HG: CPT | Performed by: STUDENT IN AN ORGANIZED HEALTH CARE EDUCATION/TRAINING PROGRAM

## 2025-06-04 PROCEDURE — 99214 OFFICE O/P EST MOD 30 MIN: CPT | Performed by: STUDENT IN AN ORGANIZED HEALTH CARE EDUCATION/TRAINING PROGRAM

## 2025-06-04 RX ORDER — FEXOFENADINE HCL 180 MG/1
180 TABLET ORAL DAILY
COMMUNITY
Start: 2025-05-25

## 2025-06-04 ASSESSMENT — FIBROSIS 4 INDEX: FIB4 SCORE: 0.53

## 2025-06-04 NOTE — PROGRESS NOTES
"Verbal consent was acquired by the patient to use [employer] ambient listening note generation during this visit     Subjective:     HPI:   History of Present Illness  The patient is a 35-year-old male who presents for follow-up.    He reports a significant improvement in dizziness, estimating a 60 to 70 percent reduction in symptoms. However, persistent head pressure remains, described as a sensation akin to a hot air balloon or cotton in his head. This symptom has been present for approximately 7 weeks. He also notes intermittent sinus pressure but reports no fevers, chills, visual disturbances, numbness, or weakness.     He has discontinued the use of Flonase due to adverse effects and switched from Xyzal to Allegra, which he tolerates well. Sinus rinses have been beneficial. An appointment with an allergist is scheduled for 08/2025 to investigate potential allergic triggers. He maintains a balanced diet and is currently on vitamin D supplementation. An upcoming appointment with a sleep specialist is also noted.    FAMILY HISTORY  His brother and father have terrible cholesterol.    Health Maintenance: Completed  No n/v/d/c, fever, chills, sob, chest pain      Objective:     Exam:  /68 (BP Location: Left arm, Patient Position: Sitting, BP Cuff Size: Adult)   Pulse 73   Temp 36.8 °C (98.2 °F) (Temporal)   Ht 1.727 m (5' 8\")   Wt 72.6 kg (160 lb)   SpO2 99%   BMI 24.33 kg/m²  Body mass index is 24.33 kg/m².    Physical Exam  Gen: nad  HENT: ncat, EOMI  Resp: nonlabored breathing   Neuro: no focal deficits, cn 2-12 intact throughout, sensation to light touch intact throughout  Psych: appropriate mood and affect      Results  Labs   - Kidney function: Normal   - Hep C: Nonreactive   - HIV: Nonreactive   - Hemoglobin: Normal   - Electrolytes: Normal   - Liver enzymes: wnl   - A1c: 5.0%   - Cholesterol: Normal   - Triglycerides: Low   - Thyroid: Normal   - Vitamin D: Normal    Assessment & Plan:     1. " Allergy, initial encounter        2. NGUYEN (obstructive sleep apnea)        3. Vertigo            Assessment & Plan  1. Vertigo.  2. Allergies   - Symptoms have improved by 60-70%.  - No side effects reported with Allegra; Flonase discontinued due to adverse effects.  - No associated fever, chills, vision problems, numbness, or weakness.  - Sinus rinses seem to help alleviate symptoms.  - Further evaluation may be necessary if symptoms worsen or new symptoms develop.  - Advised to monitor for worsening headaches, blurred vision, or fevers.  - Immediate medical attention recommended if symptoms worsen.    3. NGUYEN   Pt is using CPAP. stable    4. Health maintenance.  - Lab results indicate good overall health: kidney function, liver enzymes, hemoglobin, electrolytes, A1c, cholesterol, and triglycerides are all within normal limits.  - Advised to continue vitamin D supplements at a dosage of 5000 units.  - Upcoming appointment with an allergist in 08/2025 to evaluate potential allergies.  - Wellness check scheduled for 4 to 5 months from now, with routine labs to be ordered.    Follow-up  The patient will follow up in 4 to 5 months for a wellness check.          Return in about 5 months (around 11/4/2025) for annual visit.    Please note that this dictation was created using voice recognition software. I have made every reasonable attempt to correct obvious errors, but I expect that there are errors of grammar and possibly content that I did not discover before finalizing the note.

## 2025-06-05 ENCOUNTER — OFFICE VISIT (OUTPATIENT)
Dept: SLEEP MEDICINE | Facility: MEDICAL CENTER | Age: 35
End: 2025-06-05
Attending: PHYSICIAN ASSISTANT
Payer: COMMERCIAL

## 2025-06-05 VITALS
WEIGHT: 161 LBS | OXYGEN SATURATION: 98 % | HEIGHT: 68 IN | HEART RATE: 64 BPM | RESPIRATION RATE: 16 BRPM | SYSTOLIC BLOOD PRESSURE: 122 MMHG | DIASTOLIC BLOOD PRESSURE: 70 MMHG | BODY MASS INDEX: 24.4 KG/M2

## 2025-06-05 DIAGNOSIS — G47.33 OSA (OBSTRUCTIVE SLEEP APNEA): Primary | ICD-10-CM

## 2025-06-05 PROCEDURE — 99213 OFFICE O/P EST LOW 20 MIN: CPT | Performed by: PHYSICIAN ASSISTANT

## 2025-06-05 PROCEDURE — 3074F SYST BP LT 130 MM HG: CPT | Performed by: PHYSICIAN ASSISTANT

## 2025-06-05 PROCEDURE — 3078F DIAST BP <80 MM HG: CPT | Performed by: PHYSICIAN ASSISTANT

## 2025-06-05 PROCEDURE — 99214 OFFICE O/P EST MOD 30 MIN: CPT | Performed by: PHYSICIAN ASSISTANT

## 2025-06-05 ASSESSMENT — ENCOUNTER SYMPTOMS
INSOMNIA: 0
ORTHOPNEA: 0
FEVER: 0
COUGH: 0
TREMORS: 0
SINUS PAIN: 0
ROS GI COMMENTS: NO DENTURES, NO MISSING TEETH, NO SWALLOWING ISSUES
WHEEZING: 0
WEIGHT LOSS: 0
HEADACHES: 1
CHILLS: 0
SHORTNESS OF BREATH: 0
PALPITATIONS: 0
HEARTBURN: 1
DIZZINESS: 1
SPUTUM PRODUCTION: 0
SORE THROAT: 0

## 2025-06-05 ASSESSMENT — FIBROSIS 4 INDEX: FIB4 SCORE: 0.53

## 2025-06-05 NOTE — PATIENT INSTRUCTIONS
1-reviewed compliance which is excellent  2-demonstrating continued use and benefit  3-updated orders to [facility]  4-As a reminder use distilled water only in humidifier chamber. Fresh fill daily  5-Today we reviewed equipment cleaning  once weekly minimum  mask, tubing and water chamber  use dedicated container  use mild soap and water  SoClean or other ozone  are not recommended  white vinegar and water solution is no longer recommended  hang tubing to dry  mask sanitizing wipes are an option for use   6-Equipment replacement schedule : Mask cushion every month, Mask every 6 months, Head gear every 6 months, Tubing every 3 months, Ultra-fine filters 2 times per month, Humidifier chamber every 6 months  7-follow up in one year, sooner if needed

## 2025-06-05 NOTE — PROGRESS NOTES
"Chief Complaint   Patient presents with    Apnea     Last Office Visit 5/31/24 with Ladonna Alvarez P.A.-C.    Settings:  APAP @ 5-15cm H20    Set up: 4/8/24       HPI:  Baron Smith is a 35 y.o. year old male here today for follow-up on obstructive sleep apnea.  Last seen in clinic 5/31/2024 by INDIO Hendrickson.  Previously evaluated by Dr. Jovana Doe 4/12/2021.     Past Medical History: NGUYEN, anxiety, ADHD, allergies, vertigo.     Vitals:  /70 (BP Location: Left arm, Patient Position: Sitting, BP Cuff Size: Adult)   Pulse 64   Resp 16   Ht 1.727 m (5' 8\")   Wt 73 kg (161 lb)   SpO2 98% BMI of 24.48 kg/m².    Recent Imaging: None    Currently using  Resmed auto CPAP @ 5-15 cm H20 pressure; compliance reviewed for 5/6/2025 through 6/4/2025, days used 30/30, average daily usage 8 hours 16 minutes, 97% of days greater than or equal to 4 hours, mask leak at 0 LPM at 95th percentile, AHI 2.9, per hour.  See media for full report.    Device obtained April 8, 2024  DME provider [facility]  Mask interface fullface mask    Polysomnogram obtained 3/8/2021 demonstrates mild obstructive sleep apnea with overall AHI 6.88 events per hour increasing to 20.2 during REM sleep and 9.38 while supine. Patient with low O2 sat of 88% with sats less than or equal to 88 for 0.1 minutes of total sleep time.      Sleep schedule goes to bed 10-11 PM, wakens 6-7 a.m. , and gets up during the night rarely maybe once per week bathroom   Symptoms denies day time somnolence and denies morning headache    [place] Sleepiness Scale No data recorded   Stop Bang Score No data recorded         Review of Systems   Constitutional:  Negative for chills, fever, malaise/fatigue and weight loss.   HENT:  Positive for congestion (allergies). Negative for hearing loss, nosebleeds, sinus pain, sore throat and tinnitus.    Eyes:         Presc glasses    Respiratory:  Negative for cough, sputum production, shortness of breath and wheezing.  "   Cardiovascular:  Negative for chest pain, palpitations, orthopnea and leg swelling.   Gastrointestinal:  Positive for heartburn (occasional).        No dentures, no missing teeth, no swallowing issues    Neurological:  Positive for dizziness (related to allergies getting better) and headaches (sinus pressure). Negative for tremors.   Psychiatric/Behavioral:  The patient does not have insomnia.        Past Medical History[1]    Past Surgical History[2]    Family History   Problem Relation Age of Onset    Lupus Mother     Depression Mother     Anxiety disorder Mother     Anxiety disorder Father     Hypertension Father     No Known Problems Sister     No Known Problems Sister     Hypertension Brother     Cancer Neg Hx     Diabetes Neg Hx        Social History     Socioeconomic History    Marital status: Single     Spouse name: Not on file    Number of children: Not on file    Years of education: Not on file    Highest education level: Master's degree (e.g., MA, MS, [employer], MEd, MSW, SOURAV)   Occupational History    Not on file   Tobacco Use    Smoking status: Former     Current packs/day: 0.00     Types: Cigarettes     Start date: 2015     Quit date: 4/15/2025     Years since quittin.1    Smokeless tobacco: Never    Tobacco comments:     Nicotine Vape   Vaping Use    Vaping status: Former    Start date: 3/2/2018    Substances: Nicotine    Devices: Refillable tank   Substance and Sexual Activity    Alcohol use: Yes     Alcohol/week: 1.2 - 1.8 oz     Types: 2 - 3 Cans of beer per week     Comment: social    Drug use: Yes     Frequency: 2.0 times per week     Types: Marijuana     Comment: one or twice a week    Sexual activity: Yes     Partners: Female     Birth control/protection: None     Comment: engaged   Other Topics Concern    Not on file   Social History Narrative    Not on file     Social Drivers of Health     Financial Resource Strain: Medium Risk (2025)    Overall Financial Resource Strain (CARDIA)      Difficulty of Paying Living Expenses: Somewhat hard   Food Insecurity: No Food Insecurity (5/4/2025)    Hunger Vital Sign     Worried About Running Out of Food in the Last Year: Never true     Ran Out of Food in the Last Year: Never true   Transportation Needs: No Transportation Needs (5/4/2025)    PRAPARE - Transportation     Lack of Transportation (Medical): No     Lack of Transportation (Non-Medical): No   Physical Activity: Insufficiently Active (5/4/2025)    Exercise Vital Sign     Days of Exercise per Week: 2 days     Minutes of Exercise per Session: 50 min   Stress: No Stress Concern Present (5/4/2025)    [place] [employer] - Occupational Stress Questionnaire     Feeling of Stress : Only a little   Social Connections: Moderately Isolated (5/4/2025)    Social Connection and Isolation Panel [NHANES]     Frequency of Communication with Friends and Family: More than three times a week     Frequency of Social Gatherings with Friends and Family: Once a week     Attends Sabianist Services: Never     Active Member of Clubs or Organizations: No     Attends Club or Organization Meetings: Patient declined     Marital Status:    Intimate Partner Violence: Not on file   Housing Stability: Low Risk  (5/4/2025)    Housing Stability Vital Sign     Unable to Pay for Housing in the Last Year: No     Number of Times Moved in the Last Year: 1     Homeless in the Last Year: No       Allergies as of 06/05/2025 - Reviewed 06/05/2025   Allergen Reaction Noted    Shellfish allergy  11/13/2019          Current medications as of today Current Medications[3]      Physical Exam:   Gen:           Alert and oriented, No apparent distress. Mood and affect appropriate, normal interaction with examiner.   Hearing:     Grossly intact.  Nose:          Normal, no lesions or deformities.  Dentition:    Good dentition.   Oropharynx:   Tongue normal, posterior pharynx without erythema or exudate.  Mallampati  Classification: III  Neck:        Supple, trachea midline, no masses.  Respiratory Effort: No intercostal retractions or use of accessory muscles.   Gait and Station: Normal.  Digits and Nails: No clubbing, cyanosis, petechiae, or nodes.   Skin:        No rashes, lesions or ulcers noted.               Ext:           No cyanosis or edema.      Immunizations:  Flu: Recommended  SARS CoV2 Vaccine: 4/6/2021    Assessment / Plan:  1. NGUYEN (obstructive sleep apnea)  - DME Mask and Supplies    Reviewed compliance which is excellent, demonstrating continued use and benefit.  Will send updated orders for mask and supplies to [facility].  Reminded to use distilled water only in humidifier chamber with fresh fill daily.  Reviewed equipment cleaning and equipment replacement schedule.  Follow-up in 1 year, sooner if needed.      Follow-up:   Return in about 1 year (around 6/5/2026) for Return with Ladonna Alvarez PA-C.    Please note that this dictation was created using voice recognition software. I have made every reasonable attempt to correct obvious errors, but it is possible there are errors of grammar and possibly content that I did not discover before finalizing the note.         [1]   Past Medical History:  Diagnosis Date    Allergy     Anxiety     Apnea, sleep     Daytime sleepiness     Gasping for breath     Insomnia     Snoring    [2]   Past Surgical History:  Procedure Laterality Date    DENTAL EXTRACTION(S)     [3]   Current Outpatient Medications   Medication Sig Dispense Refill    fexofenadine (ALLEGRA ALLERGY) 180 MG tablet Take 180 mg by mouth every day.       No current facility-administered medications for this visit.

## 2025-08-12 ENCOUNTER — OFFICE VISIT (OUTPATIENT)
Dept: URGENT CARE | Facility: CLINIC | Age: 35
End: 2025-08-12
Payer: COMMERCIAL

## 2025-08-12 VITALS
TEMPERATURE: 98.8 F | SYSTOLIC BLOOD PRESSURE: 108 MMHG | WEIGHT: 161 LBS | HEART RATE: 76 BPM | RESPIRATION RATE: 16 BRPM | HEIGHT: 68 IN | OXYGEN SATURATION: 98 % | DIASTOLIC BLOOD PRESSURE: 80 MMHG | BODY MASS INDEX: 24.4 KG/M2

## 2025-08-12 DIAGNOSIS — J01.10 ACUTE NON-RECURRENT FRONTAL SINUSITIS: ICD-10-CM

## 2025-08-12 DIAGNOSIS — J02.9 SORE THROAT: Primary | ICD-10-CM

## 2025-08-12 LAB — S PYO DNA SPEC NAA+PROBE: NOT DETECTED

## 2025-08-12 PROCEDURE — 3074F SYST BP LT 130 MM HG: CPT | Performed by: FAMILY MEDICINE

## 2025-08-12 PROCEDURE — 87651 STREP A DNA AMP PROBE: CPT | Performed by: FAMILY MEDICINE

## 2025-08-12 PROCEDURE — 3079F DIAST BP 80-89 MM HG: CPT | Performed by: FAMILY MEDICINE

## 2025-08-12 PROCEDURE — 99214 OFFICE O/P EST MOD 30 MIN: CPT | Performed by: FAMILY MEDICINE

## 2025-08-12 RX ORDER — AZITHROMYCIN 250 MG/1
TABLET, FILM COATED ORAL
Qty: 6 TABLET | Refills: 0 | Status: SHIPPED | OUTPATIENT
Start: 2025-08-12

## 2025-08-12 ASSESSMENT — ENCOUNTER SYMPTOMS
MUSCULOSKELETAL NEGATIVE: 1
CARDIOVASCULAR NEGATIVE: 1
COUGH: 1
SORE THROAT: 1
FEVER: 1
EYES NEGATIVE: 1
GASTROINTESTINAL NEGATIVE: 1

## 2025-08-12 ASSESSMENT — FIBROSIS 4 INDEX: FIB4 SCORE: 0.53
